# Patient Record
Sex: MALE | Race: WHITE | Employment: UNEMPLOYED | URBAN - METROPOLITAN AREA
[De-identification: names, ages, dates, MRNs, and addresses within clinical notes are randomized per-mention and may not be internally consistent; named-entity substitution may affect disease eponyms.]

---

## 2017-10-11 ENCOUNTER — NON-PROVIDER VISIT (OUTPATIENT)
Dept: OCCUPATIONAL MEDICINE | Facility: CLINIC | Age: 32
End: 2017-10-11

## 2017-10-11 DIAGNOSIS — Z02.1 PRE-EMPLOYMENT DRUG TESTING: ICD-10-CM

## 2017-10-11 DIAGNOSIS — Z02.1 PRE-EMPLOYMENT DRUG SCREENING: ICD-10-CM

## 2017-10-11 LAB
AMP AMPHETAMINE: NORMAL
COC COCAINE: NORMAL
INT CON NEG: NORMAL
INT CON POS: NORMAL
MET METHAMPHETAMINES: NORMAL
OPI OPIATES: NORMAL
PCP PHENCYCLIDINE: NORMAL
POC DRUG COMMENT 753798-OCCUPATIONAL HEALTH: NORMAL
THC: NORMAL

## 2017-10-11 PROCEDURE — 80305 DRUG TEST PRSMV DIR OPT OBS: CPT | Performed by: PREVENTIVE MEDICINE

## 2018-10-23 ENCOUNTER — OFFICE VISIT (OUTPATIENT)
Dept: URGENT CARE | Facility: CLINIC | Age: 33
End: 2018-10-23

## 2018-10-23 VITALS
WEIGHT: 186 LBS | BODY MASS INDEX: 27.55 KG/M2 | DIASTOLIC BLOOD PRESSURE: 66 MMHG | TEMPERATURE: 98.3 F | RESPIRATION RATE: 18 BRPM | HEART RATE: 74 BPM | HEIGHT: 69 IN | OXYGEN SATURATION: 96 % | SYSTOLIC BLOOD PRESSURE: 108 MMHG

## 2018-10-23 DIAGNOSIS — M54.50 LUMBAR PAIN: ICD-10-CM

## 2018-10-23 PROCEDURE — 99204 OFFICE O/P NEW MOD 45 MIN: CPT | Performed by: PHYSICIAN ASSISTANT

## 2018-10-23 RX ORDER — CYCLOBENZAPRINE HCL 5 MG
5-10 TABLET ORAL 3 TIMES DAILY PRN
Qty: 21 TAB | Refills: 0 | Status: SHIPPED | OUTPATIENT
Start: 2018-10-23

## 2018-10-23 RX ORDER — KETOROLAC TROMETHAMINE 30 MG/ML
60 INJECTION, SOLUTION INTRAMUSCULAR; INTRAVENOUS ONCE
Status: COMPLETED | OUTPATIENT
Start: 2018-10-23 | End: 2018-10-23

## 2018-10-23 RX ORDER — METHYLPREDNISOLONE 4 MG/1
TABLET ORAL
Qty: 1 KIT | Refills: 0 | Status: SHIPPED | OUTPATIENT
Start: 2018-10-23

## 2018-10-23 RX ADMIN — KETOROLAC TROMETHAMINE 60 MG: 30 INJECTION, SOLUTION INTRAMUSCULAR; INTRAVENOUS at 13:10

## 2018-10-23 NOTE — LETTER
October 23, 2018         Patient: Yazan Hough   YOB: 1985   Date of Visit: 10/23/2018           To Whom it May Concern:    Yazan Hough was seen in my clinic on 10/23/2018. Please excuse this patient from work due to recent back ailment- he will be out Wednesday- Friday.     If you have any questions or concerns, please don't hesitate to call.        Sincerely,           Ney Correa P.A.-C.  Electronically Signed

## 2018-10-26 ASSESSMENT — ENCOUNTER SYMPTOMS
NAUSEA: 0
WHEEZING: 0
VOMITING: 0
COUGH: 0
PERIANAL NUMBNESS: 0
CHILLS: 0
NUMBNESS: 0
EYE REDNESS: 0
EYE DISCHARGE: 0
FALLS: 0
TINGLING: 0
ABDOMINAL PAIN: 0
BACK PAIN: 1
DIARRHEA: 0
NECK PAIN: 0
SENSORY CHANGE: 0
HEADACHES: 0
SORE THROAT: 0
FEVER: 0
LEG PAIN: 0
BOWEL INCONTINENCE: 0

## 2018-10-26 NOTE — PROGRESS NOTES
"Subjective:      Yazan Hough is a 33 y.o. male who presents with Back Pain (x1day, lower back pain unable to move-)            Pt is 34 y/o male who presents with low back pain on awakening 2 days ago. He reports that he awoke with stiffness, and then felt that the right side of his low back became worse.   He denies any leg pain, incontinence, saddle anesthesias, fevers. He denies any fall, trauma, or noted injury. He denies prior hx of same in the past.       Back Pain   This is a new problem. The pain is at a severity of 7/10. The pain is mild. The symptoms are aggravated by bending and position. Pertinent negatives include no abdominal pain, bladder incontinence, bowel incontinence, dysuria, fever, headaches, leg pain, numbness, perianal numbness or tingling. He has tried heat (Topical rub. ) for the symptoms. The treatment provided mild relief.       Review of Systems   Constitutional: Positive for malaise/fatigue. Negative for chills and fever.   HENT: Negative for ear discharge, ear pain and sore throat.    Eyes: Negative for discharge and redness.   Respiratory: Negative for cough and wheezing.    Gastrointestinal: Negative for abdominal pain, bowel incontinence, diarrhea, nausea and vomiting.   Genitourinary: Negative for bladder incontinence, dysuria and urgency.        Denies any new urinary or bowel incontinence   Musculoskeletal: Positive for back pain. Negative for falls and neck pain.        Specifically without leg pain or weakness   Skin: Negative for itching and rash.   Neurological: Negative for tingling, sensory change, numbness and headaches.   All other systems reviewed and are negative.         Objective:     /66 (BP Location: Left arm, Patient Position: Sitting, BP Cuff Size: Adult)   Pulse 74   Temp 36.8 °C (98.3 °F) (Temporal)   Resp 18   Ht 1.753 m (5' 9\")   Wt 84.4 kg (186 lb)   SpO2 96%   BMI 27.47 kg/m²    PMH:  has no past medical history on file.  MEDS:   Current " Outpatient Prescriptions:   •  MethylPREDNISolone (MEDROL DOSEPAK) 4 MG Tablet Therapy Pack, UAD, Disp: 1 Kit, Rfl: 0  •  cyclobenzaprine (FLEXERIL) 5 MG tablet, Take 1-2 Tabs by mouth 3 times a day as needed (Avoid while driving.)., Disp: 21 Tab, Rfl: 0  •  oxycodone-acetaminophen (PERCOCET) 5-325 MG TABS, Take 1-2 Tabs by mouth every four hours as needed for Mild Pain. pain, Disp: 20 Each, Rfl: 0  ALLERGIES:   Allergies   Allergen Reactions   • Codeine      SURGHX: History reviewed. No pertinent surgical history.  SOCHX:  reports that he has quit smoking. He has never used smokeless tobacco. He reports that he does not drink alcohol or use drugs.  FH: Family history was reviewed, no pertinent findings to report    Physical Exam   Constitutional: He is oriented to person, place, and time. He appears well-developed and well-nourished. No distress.   HENT:   Head: Normocephalic and atraumatic.   Eyes: Pupils are equal, round, and reactive to light. Conjunctivae and EOM are normal.   Neck: Normal range of motion. Neck supple. No tracheal deviation present.   Cardiovascular: Normal rate and regular rhythm.    No murmur heard.  Pulmonary/Chest: Effort normal. No respiratory distress.   Musculoskeletal:        Back:    Spine- without midline tenderness, step-off or deformity. Without scoliosis or kyphosis. Noted tenderness to the parapspinous - lumbar aspect. Limited ROM with lateral bend, and flexion/extension. Without noted tenderness over the sacroiliac notches. Sensation intact bilaterally, BLE motor 5/5 and symmetrical  strength. Negative Straight leg raise.  Gait- WNL without foot drop.      Neurological: He is alert and oriented to person, place, and time. Coordination normal.   Skin: Skin is warm. No rash noted.   Psychiatric: He has a normal mood and affect. His behavior is normal. Judgment and thought content normal.   Vitals reviewed.              Assessment/Plan:     1. Lumbar pain.     - ketorolac (TORADOL)  injection 60 mg; 2 mL by Intramuscular route Once.  - MethylPREDNISolone (MEDROL DOSEPAK) 4 MG Tablet Therapy Pack; UAD  Dispense: 1 Kit; Refill: 0  - cyclobenzaprine (FLEXERIL) 5 MG tablet; Take 1-2 Tabs by mouth 3 times a day as needed (Avoid while driving.).  Dispense: 21 Tab; Refill: 0    Pt. Without red flag symptoms at this time.   Encouraged light stretching along with OTC meds- utilize tylenol over NSAIDs at this time- no NSAIDs with steroids.   Encouraged F/U with PCP at this time- if not improving patient may require imaging- however he is without midline tenderness at this time.   Patient given precautionary s/sx that mandate immediate follow up and evaluation in the ED. Advised of risks of not doing so.    DDX, Supportive care, and indications for immediate follow-up discussed with patient.    Instructed to return to clinic or nearest emergency department if we are not available for any change in condition, further concerns, or worsening of symptoms.    The patient demonstrated a good understanding and agreed with the treatment plan.  Please note that this dictation was created using voice recognition software. I have made every reasonable attempt to correct obvious errors, but I expect that there are errors of grammar and possibly content that I did not discover before finalizing the note.

## 2019-05-09 ENCOUNTER — TELEPHONE (OUTPATIENT)
Dept: HEMATOLOGY ONCOLOGY | Facility: MEDICAL CENTER | Age: 34
End: 2019-05-09

## 2019-05-09 NOTE — TELEPHONE ENCOUNTER
1st attempt to call patient.    Called patient for no show appointment with  today 05/09/2019 at 11:20. The patient does not have voice mail.

## 2023-03-30 ENCOUNTER — APPOINTMENT (OUTPATIENT)
Dept: RADIOLOGY | Facility: MEDICAL CENTER | Age: 38
End: 2023-03-30
Attending: EMERGENCY MEDICINE
Payer: MEDICARE

## 2023-03-30 ENCOUNTER — HOSPITAL ENCOUNTER (EMERGENCY)
Facility: MEDICAL CENTER | Age: 38
End: 2023-03-30
Attending: EMERGENCY MEDICINE
Payer: MEDICARE

## 2023-03-30 ENCOUNTER — APPOINTMENT (OUTPATIENT)
Dept: RADIOLOGY | Facility: MEDICAL CENTER | Age: 38
End: 2023-03-30
Payer: MEDICARE

## 2023-03-30 VITALS
RESPIRATION RATE: 18 BRPM | HEIGHT: 69 IN | BODY MASS INDEX: 28.88 KG/M2 | SYSTOLIC BLOOD PRESSURE: 129 MMHG | OXYGEN SATURATION: 96 % | TEMPERATURE: 97.8 F | WEIGHT: 195 LBS | DIASTOLIC BLOOD PRESSURE: 93 MMHG | HEART RATE: 92 BPM

## 2023-03-30 DIAGNOSIS — F10.920 ALCOHOLIC INTOXICATION WITHOUT COMPLICATION (HCC): ICD-10-CM
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
## 2023-03-30 DIAGNOSIS — S00.531A CONTUSION OF LIP, INITIAL ENCOUNTER: ICD-10-CM

## 2023-03-30 DIAGNOSIS — R04.0 EPISTAXIS: ICD-10-CM

## 2023-03-30 PROCEDURE — 71045 X-RAY EXAM CHEST 1 VIEW: CPT

## 2023-03-30 PROCEDURE — 99284 EMERGENCY DEPT VISIT MOD MDM: CPT

## 2023-03-30 PROCEDURE — 72125 CT NECK SPINE W/O DYE: CPT

## 2023-03-30 PROCEDURE — 305948 HCHG GREEN TRAUMA ACT PRE-NOTIFY NO CC

## 2023-03-30 PROCEDURE — 70450 CT HEAD/BRAIN W/O DYE: CPT
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
## 2023-03-30 NOTE — ED NOTES
Pt brought back to room from CT. Pt jumping out of bed to urinate.  Pt assisted with standing and using urinal.  UA collected and sent to lab. Pt educated to stay in bed and to use call light. Pt verbalized understanding.

## 2023-03-30 NOTE — PROGRESS NOTES
I was paged at 1052 to consult on this trauma green activation. I arrived at the patient's patient bedside at [phone].

## 2023-03-30 NOTE — ED NOTES
Pt has discharge orders. Pt educated on discharge instructions.  Pt verbalizes understanding.  PIV removed. Pt ambulatory to ambulance bay with Anderson MUNOZ.

## 2023-03-30 NOTE — ED NOTES
Pt has discharge orders. Pt educated on discharge instructions.  Pt verbalizes understanding.  PIV removed. Pt ambulatory to ambulance bay with steady gait. Pt going home with Anderson MUNOZ.    (4) no impairment
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
## 2023-03-30 NOTE — DISCHARGE INSTRUCTIONS
Ice to painful areas 20 minutes on, 20 minutes off as often as possible.  Ibuprofen and Tylenol for pain.  Please do not drink and drive.    Cleared for booking in intermediate

## 2023-04-10 ENCOUNTER — HOSPITAL ENCOUNTER (OUTPATIENT)
Dept: BEHAVIORAL HEALTH | Facility: MEDICAL CENTER | Age: 38
End: 2023-04-10
Attending: PSYCHIATRY & NEUROLOGY
Payer: MEDICARE

## 2023-04-10 DIAGNOSIS — F10.10 ALCOHOL ABUSE, EPISODIC DRINKING BEHAVIOR: ICD-10-CM

## 2023-04-10 PROCEDURE — 90791 PSYCH DIAGNOSTIC EVALUATION: CPT
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
## 2023-04-17 ENCOUNTER — HOSPITAL ENCOUNTER (OUTPATIENT)
Dept: BEHAVIORAL HEALTH | Facility: MEDICAL CENTER | Age: 38
End: 2023-04-17
Attending: PSYCHIATRY & NEUROLOGY
Payer: MEDICARE

## 2023-04-17 DIAGNOSIS — F10.10 ALCOHOL ABUSE, EPISODIC DRINKING BEHAVIOR: ICD-10-CM

## 2023-04-17 PROCEDURE — 90853 GROUP PSYCHOTHERAPY: CPT | Performed by: SOCIAL WORKER

## 2023-04-17 NOTE — GROUP NOTE
Group Appointment Information    Date: 04/17/23   Attendance Duration: 60 minutes  Number of Participants: 5 participants  Program / Group: IOP - Intensive Outpatient Program  Topics Covered: Boundaries      Group Therapy Start Time: 11:00 AM    Attendance: Attended  Participation: Active verbal participation    Affect/Mood Range: Flexible  Affect/Mood Display: CWC - Congruent w/Content  Cognition: Alert    Evidence of imminent suicide risk: No   Evidence of imminent homicide risk: No     Therapeutic Interventions: Goal-setting  Progress Toward Treatment Goal: Mild improvement

## 2023-04-17 NOTE — ADDENDUM NOTE
Encounter addended by: Carmelita Harrell, Ph.D. on: 4/17/2023 12:04 PM   Actions taken: Clinical Note Signed, Charge Capture section accepted

## 2023-04-17 NOTE — GROUP NOTE
Group Appointment Information    Date: 04/17/23   Attendance Duration: 60 minutes  Number of Participants: 6 participants  Program / Group: IOP - Intensive Outpatient Program  Topics Covered: Cognitive distortions      Group Therapy Start Time: 10:00 AM    Attendance: Attended  Participation: Active verbal participation    Affect/Mood Range: Flexible  Affect/Mood Display: CWC - Congruent w/Content  Cognition: Alert    Evidence of imminent suicide risk: No   Evidence of imminent homicide risk: No     Therapeutic Interventions: Cognitive clarification  Progress Toward Treatment Goal: Mild improvement

## 2023-04-17 NOTE — ADDENDUM NOTE
Encounter addended by: Carmelita Harrell, Ph.D. on: 4/17/2023 12:15 PM   Actions taken: Clinical Note Signed, Charge Capture section accepted

## 2023-04-17 NOTE — GROUP NOTE
Group Appointment Information    Date: 04/17/23   Attendance Duration: 60 minutes  Number of Participants: 7 participants  Program / Group: IOP - Intensive Outpatient Program  Topics Covered: Regulating emotions      Group Therapy Start Time:  9:00 AM    Attendance: Attended  Participation: Active verbal participation    Affect/Mood Range: Flexible  Affect/Mood Display: CWC - Congruent w/Content  Cognition: Alert    Evidence of imminent suicide risk: No   Evidence of imminent homicide risk: No     Therapeutic Interventions: Distress tolerance skills  Progress Toward Treatment Goal: Mild improvement

## 2023-04-19 ENCOUNTER — HOSPITAL ENCOUNTER (OUTPATIENT)
Dept: BEHAVIORAL HEALTH | Facility: MEDICAL CENTER | Age: 38
End: 2023-04-19
Attending: PSYCHIATRY & NEUROLOGY
Payer: MEDICARE

## 2023-04-19 PROCEDURE — 90853 GROUP PSYCHOTHERAPY: CPT | Performed by: SOCIAL WORKER

## 2023-04-19 NOTE — GROUP NOTE
Group Appointment Information    Date: 04/19/23   Attendance Duration: 60 minutes  Number of Participants: 5 participants  Program / Group: IOP - Intensive Outpatient Program  Topics Covered: 12 Step orientation      Group Therapy Start Time: 10:00 AM    Attendance: Attended  Participation: Active verbal participation    Affect/Mood Range: Flexible  Affect/Mood Display: CWC - Congruent w/Content  Cognition: Alert    Evidence of imminent suicide risk: No   Evidence of imminent homicide risk: No     Therapeutic Interventions: Relapse prevention  Progress Toward Treatment Goal: Mild improvement

## 2023-04-19 NOTE — GROUP NOTE
Group Appointment Information    Date: 04/19/23   Attendance Duration: 60 minutes  Number of Participants: 5 participants  Program / Group: IOP - Intensive Outpatient Program  Topics Covered: Regulating emotions      Group Therapy Start Time:  9:00 AM    Attendance: Attended  Participation: Active verbal participation    Affect/Mood Range: Flexible  Affect/Mood Display: CWC - Congruent w/Content  Cognition: Alert    Evidence of imminent suicide risk: No   Evidence of imminent homicide risk: No     Therapeutic Interventions: Emotional regulation skill building  Progress Toward Treatment Goal: Mild improvement

## 2023-04-19 NOTE — GROUP NOTE
Group Appointment Information    Date: 04/19/23   Attendance Duration: 60 minutes  Number of Participants: 5 participants  Program / Group: IOP - Intensive Outpatient Program  Topics Covered: Care in Relationships      Group Therapy Start Time: 11:00 AM    Attendance: Attended  Participation: Active verbal participation    Affect/Mood Range: Flexible  Affect/Mood Display: CWC - Congruent w/Content  Cognition: Alert    Evidence of imminent suicide risk: No   Evidence of imminent homicide risk: No     Therapeutic Interventions: Self-care skills  Progress Toward Treatment Goal: Mild improvement

## 2023-04-21 ENCOUNTER — OFFICE VISIT (OUTPATIENT)
Dept: BEHAVIORAL HEALTH | Facility: CLINIC | Age: 38
End: 2023-04-21
Payer: MEDICARE

## 2023-04-21 ENCOUNTER — HOSPITAL ENCOUNTER (OUTPATIENT)
Dept: BEHAVIORAL HEALTH | Facility: MEDICAL CENTER | Age: 38
End: 2023-04-21
Attending: PSYCHIATRY & NEUROLOGY
Payer: MEDICARE

## 2023-04-21 DIAGNOSIS — F33.1 MAJOR DEPRESSIVE DISORDER, RECURRENT EPISODE, MODERATE (HCC): ICD-10-CM

## 2023-04-21 PROCEDURE — 90853 GROUP PSYCHOTHERAPY: CPT | Performed by: SOCIAL WORKER

## 2023-04-21 PROCEDURE — 90792 PSYCH DIAG EVAL W/MED SRVCS: CPT | Performed by: PSYCHIATRY & NEUROLOGY

## 2023-04-21 RX ORDER — FLUOXETINE HYDROCHLORIDE 20 MG/1
20 CAPSULE ORAL 3 TIMES DAILY
Qty: 90 CAPSULE | Refills: 0 | Status: SHIPPED | OUTPATIENT
Start: 2023-04-21 | End: 2023-05-05

## 2023-04-21 ASSESSMENT — ANXIETY QUESTIONNAIRES
5. BEING SO RESTLESS THAT IT IS HARD TO SIT STILL: NOT AT ALL
3. WORRYING TOO MUCH ABOUT DIFFERENT THINGS: SEVERAL DAYS
7. FEELING AFRAID AS IF SOMETHING AWFUL MIGHT HAPPEN: NOT AT ALL
2. NOT BEING ABLE TO STOP OR CONTROL WORRYING: NOT AT ALL
4. TROUBLE RELAXING: MORE THAN HALF THE DAYS
IF YOU CHECKED OFF ANY PROBLEMS ON THIS QUESTIONNAIRE, HOW DIFFICULT HAVE THESE PROBLEMS MADE IT FOR YOU TO DO YOUR WORK, TAKE CARE OF THINGS AT HOME, OR GET ALONG WITH OTHER PEOPLE: SOMEWHAT DIFFICULT
GAD7 TOTAL SCORE: 4
1. FEELING NERVOUS, ANXIOUS, OR ON EDGE: NOT AT ALL
6. BECOMING EASILY ANNOYED OR IRRITABLE: SEVERAL DAYS

## 2023-04-21 ASSESSMENT — PATIENT HEALTH QUESTIONNAIRE - PHQ9
5. POOR APPETITE OR OVEREATING: 3 - NEARLY EVERY DAY
CLINICAL INTERPRETATION OF PHQ2 SCORE: 0

## 2023-04-21 NOTE — GROUP NOTE
Group Appointment Information    Date: 04/21/23   Attendance Duration: 60 minutes  Number of Participants: 7 participants  Program / Group: IOP - Intensive Outpatient Program  Topics Covered: Values based action      Group Therapy Start Time: 10:00 AM    Attendance: Attended  Participation: Active verbal participation    Affect/Mood Range: Flexible  Affect/Mood Display: CWC - Congruent w/Content  Cognition: Alert    Evidence of imminent suicide risk: No   Evidence of imminent homicide risk: No     Therapeutic Interventions: Goal-setting  Progress Toward Treatment Goal: Mild improvement
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
## 2023-04-21 NOTE — GROUP NOTE
Group Appointment Information    Date: 04/21/23   Attendance Duration: 60 minutes  Number of Participants: 7 participants  Program / Group: IOP - Intensive Outpatient Program  Topics Covered: Boundaries      Group Therapy Start Time: 11:00 AM    Attendance: Attended  Participation: Active verbal participation    Affect/Mood Range: Flexible  Affect/Mood Display: CWC - Congruent w/Content  Cognition: Alert    Evidence of imminent suicide risk: No   Evidence of imminent homicide risk: No     Therapeutic Interventions: Goal-setting-Challenged patient to develop a future focus and create goals to work towards. Also encouraged patient to seek others who are experiencing similar medical challenges and create a support system and learn from others who have demonstrated resilience.  Progress Toward Treatment Goal: Mild improvement

## 2023-04-21 NOTE — GROUP NOTE
Group Appointment Information    Date: 04/21/23   Attendance Duration: 60 minutes  Number of Participants: 7 participants  Program / Group: IOP - Intensive Outpatient Program  Topics Covered: Self Affirmation      Group Therapy Start Time:  9:00 AM    Attendance: Attended  Participation: Active verbal participation    Affect/Mood Range: Flexible  Affect/Mood Display: CWC - Congruent w/Content  Cognition: Alert    Evidence of imminent suicide risk: No   Evidence of imminent homicide risk: No     Therapeutic Interventions: Self-care skills  Progress Toward Treatment Goal: Mild improvement

## 2023-04-24 ENCOUNTER — HOSPITAL ENCOUNTER (OUTPATIENT)
Dept: BEHAVIORAL HEALTH | Facility: MEDICAL CENTER | Age: 38
End: 2023-04-24
Attending: PSYCHIATRY & NEUROLOGY
Payer: MEDICARE

## 2023-04-24 PROCEDURE — 90853 GROUP PSYCHOTHERAPY: CPT | Performed by: SOCIAL WORKER

## 2023-04-25 NOTE — GROUP NOTE
Group Appointment Information    Date: 04/24/23   Attendance Duration: 60 minutes  Number of Participants: 6 participants  Program / Group: IOP - Intensive Outpatient Program  Topics Covered: Relationships in Recovery      Group Therapy Start Time: 11:00 AM    Attendance: Attended  Participation: Active verbal participation    Affect/Mood Range: Flexible  Affect/Mood Display: CWC - Congruent w/Content  Cognition: Alert    Evidence of imminent suicide risk: No   Evidence of imminent homicide risk: No     Therapeutic Interventions: Reality-Testing  Progress Toward Treatment Goal: Mild improvement

## 2023-04-25 NOTE — GROUP NOTE
Group Appointment Information    Date: 04/24/23   Attendance Duration: 60 minutes  Number of Participants: 7 participants  Program / Group: IOP - Intensive Outpatient Program  Topics Covered: Care in Relationships      Group Therapy Start Time:  9:00 AM    Attendance: Attended  Participation: Active verbal participation    Affect/Mood Range: Flexible  Affect/Mood Display: CWC - Congruent w/Content  Cognition: Alert    Evidence of imminent suicide risk: No   Evidence of imminent homicide risk: No     Therapeutic Interventions: Motivation addressed  Progress Toward Treatment Goal: Mild improvement

## 2023-04-25 NOTE — GROUP NOTE
Group Appointment Information    Date: 04/24/23   Attendance Duration: 60 minutes  Number of Participants: 6 participants  Program / Group: IOP - Intensive Outpatient Program  Topics Covered: Anxiety Mgmt      Group Therapy Start Time: 10:00 AM    Attendance: Attended  Participation: Active verbal participation    Affect/Mood Range: Flexible  Affect/Mood Display: CWC - Congruent w/Content  Cognition: Alert    Evidence of imminent suicide risk: No   Evidence of imminent homicide risk: No     Therapeutic Interventions: Limit-setting  Progress Toward Treatment Goal: Mild improvement

## 2023-04-26 ENCOUNTER — HOSPITAL ENCOUNTER (OUTPATIENT)
Dept: BEHAVIORAL HEALTH | Facility: MEDICAL CENTER | Age: 38
End: 2023-04-26
Attending: PSYCHIATRY & NEUROLOGY
Payer: MEDICARE

## 2023-04-26 PROCEDURE — 90853 GROUP PSYCHOTHERAPY: CPT | Performed by: SOCIAL WORKER

## 2023-04-26 NOTE — GROUP NOTE
Group Appointment Information    Date: 04/26/23   Attendance Duration: 60 minutes  Number of Participants: 7 participants  Program / Group: IOP - Intensive Outpatient Program  Topics Covered: Dual Diagnosis, Depression Recovery, and Regulating emotions      Group Therapy Start Time: 10:00 AM    Attendance: Attended  Participation: Active verbal participation    Affect/Mood Range: Flexible  Affect/Mood Display: CWC - Congruent w/Content  Cognition: Alert    Evidence of imminent suicide risk: No   Evidence of imminent homicide risk: No     Therapeutic Interventions: Relapse prevention  Progress Toward Treatment Goal: Mild improvement

## 2023-04-26 NOTE — GROUP NOTE
Group Appointment Information    Date: 04/26/23   Attendance Duration: 60 minutes  Number of Participants: 7 participants  Program / Group: IOP - Intensive Outpatient Program  Topics Covered: Belief Systems      Group Therapy Start Time: 11:00 AM    Attendance: Attended  Participation: Active verbal participation    Affect/Mood Range: Flexible  Affect/Mood Display: CWC - Congruent w/Content  Cognition: Alert    Evidence of imminent suicide risk: No   Evidence of imminent homicide risk: No     Therapeutic Interventions: Problem-solving  Progress Toward Treatment Goal: Mild improvement

## 2023-04-26 NOTE — GROUP NOTE
Group Appointment Information    Date: 04/26/23   Attendance Duration: 60 minutes  Number of Participants: 7 participants  Program / Group: IOP - Intensive Outpatient Program  Topics Covered: Self Affirmation      Group Therapy Start Time:  9:00 AM    Attendance: Attended  Participation: Active verbal participation    Affect/Mood Range: Flexible  Affect/Mood Display: CWC - Congruent w/Content  Cognition: Alert    Evidence of imminent suicide risk: No   Evidence of imminent homicide risk: No     Therapeutic Interventions: Self-care skills  Progress Toward Treatment Goal: Mild improvement

## 2023-04-28 ENCOUNTER — HOSPITAL ENCOUNTER (OUTPATIENT)
Dept: BEHAVIORAL HEALTH | Facility: MEDICAL CENTER | Age: 38
End: 2023-04-28
Attending: PSYCHIATRY & NEUROLOGY
Payer: MEDICARE

## 2023-04-28 ENCOUNTER — HOSPITAL ENCOUNTER (OUTPATIENT)
Facility: MEDICAL CENTER | Age: 38
End: 2023-04-28
Attending: STUDENT IN AN ORGANIZED HEALTH CARE EDUCATION/TRAINING PROGRAM
Payer: MEDICARE

## 2023-04-28 PROCEDURE — 87086 URINE CULTURE/COLONY COUNT: CPT

## 2023-04-28 PROCEDURE — 90853 GROUP PSYCHOTHERAPY: CPT | Performed by: SOCIAL WORKER

## 2023-04-28 NOTE — GROUP NOTE
Group Appointment Information    Date: 04/28/23   Attendance Duration: 90 minutes  Number of Participants: 7 participants  Program / Group: IOP - Intensive Outpatient Program  Topics Covered: Care in Relationships      Group Therapy Start Time:  9:00 AM    Attendance: Attended  Participation: Active verbal participation    Affect/Mood Range: Normal range  Affect/Mood Display: CWC - Congruent w/Content  Cognition: Alert    Evidence of imminent suicide risk: No   Evidence of imminent homicide risk: No     Therapeutic Interventions: Supportive psychotherapy and Interpersonal effectiveness skills  Progress Toward Treatment Goal: Mild improvement

## 2023-04-28 NOTE — GROUP NOTE
Group Appointment Information    Date: 04/28/23   Attendance Duration: 60 minutes  Number of Participants: 7 participants  Program / Group: IOP - Intensive Outpatient Program  Topics Covered: Values based action  Second group continuation of morning topic, exploring more in depth how family impacts development of values      Group Therapy Start Time: 10:30 AM    Attendance: Attended  Participation: Active verbal participation and Attentive    Affect/Mood Range: Normal range  Affect/Mood Display: CWC - Congruent w/Content  Cognition: Alert    Evidence of imminent suicide risk: No   Evidence of imminent homicide risk: No     Therapeutic Interventions: Socialization and Supportive psychotherapy  Progress Toward Treatment Goal: Mild improvement

## 2023-04-28 NOTE — GROUP NOTE
Group Appointment Information    Date: 04/28/23   Attendance Duration: 30 minutes  Number of Participants: 7 participants  Program / Group: IOP - Intensive Outpatient Program  Topics Covered: Values based action    Wrap up of today's topics and review of trauma.      Group Therapy Start Time: 11:30 AM    Attendance: Attended  Participation: Active verbal participation and Attentive    Affect/Mood Range: Normal range  Affect/Mood Display: CWC - Congruent w/Content  Cognition: Alert    Evidence of imminent suicide risk: No  Evidence of imminent homicide risk: No     Therapeutic Interventions: Values clarification, Supportive psychotherapy, and Interpersonal effectiveness skills  Progress Toward Treatment Goal: Mild improvement

## 2023-05-01 ENCOUNTER — HOSPITAL ENCOUNTER (OUTPATIENT)
Dept: BEHAVIORAL HEALTH | Facility: MEDICAL CENTER | Age: 38
End: 2023-05-01
Attending: PSYCHIATRY & NEUROLOGY
Payer: MEDICARE

## 2023-05-01 LAB
BACTERIA UR CULT: NORMAL
SIGNIFICANT IND 70042: NORMAL
SITE SITE: NORMAL
SOURCE SOURCE: NORMAL

## 2023-05-01 PROCEDURE — 90853 GROUP PSYCHOTHERAPY: CPT | Performed by: SOCIAL WORKER

## 2023-05-01 NOTE — GROUP NOTE
Group Appointment Information    Date: 05/01/23   Attendance Duration: 60 minutes  Number of Participants: 9 participants  Program / Group: IOP - Intensive Outpatient Program  Topics Covered: Values based action      Group Therapy Start Time: 11:00 AM    Attendance: Attended  Participation: Active verbal participation    Affect/Mood Range: Flexible  Affect/Mood Display: CWC - Congruent w/Content  Cognition: Alert    Evidence of imminent suicide risk: No   Evidence of imminent homicide risk: No     Therapeutic Interventions: Values clarification-homework-start looking at community [email or web address] for possible job training programs of interest-report back next session-goal is to move forward beyond the belief that I cannot do anything because of my challenges in ability to walk  Progress Toward Treatment Goal: Mild improvement

## 2023-05-01 NOTE — GROUP NOTE
Group Appointment Information    Date: 05/01/23   Attendance Duration: 60 minutes  Number of Participants: 9 participants  Program / Group: IOP - Intensive Outpatient Program  Topics Covered: Mindfulness      Group Therapy Start Time:  9:00 AM    Attendance: Attended  Participation: Active verbal participation    Affect/Mood Range: Flexible  Affect/Mood Display: CWC - Congruent w/Content  Cognition: Alert    Evidence of imminent suicide risk: No   Evidence of imminent homicide risk: No     Therapeutic Interventions: Mindfulness exercise  Progress Toward Treatment Goal: Mild improvement

## 2023-05-01 NOTE — GROUP NOTE
Group Appointment Information    Date: 05/01/23   Attendance Duration: 60 minutes  Number of Participants: 9 participants  Program / Group: IOP - Intensive Outpatient Program  Topics Covered: Healing Family within      Group Therapy Start Time: 10:00 AM    Attendance: Attended  Participation: Active verbal participation    Affect/Mood Range: Flexible  Affect/Mood Display: CWC - Congruent w/Content  Cognition: Alert    Evidence of imminent suicide risk: No   Evidence of imminent homicide risk: No     Therapeutic Interventions: Psychoeducation  Progress Toward Treatment Goal: Mild improvement

## 2023-05-03 ENCOUNTER — HOSPITAL ENCOUNTER (OUTPATIENT)
Dept: BEHAVIORAL HEALTH | Facility: MEDICAL CENTER | Age: 38
End: 2023-05-03
Attending: PSYCHIATRY & NEUROLOGY
Payer: MEDICARE

## 2023-05-03 PROCEDURE — 90853 GROUP PSYCHOTHERAPY: CPT | Performed by: SOCIAL WORKER

## 2023-05-03 NOTE — GROUP NOTE
"Group Appointment Information    Date: 05/03/23   Attendance Duration: 60 minutes  Number of Participants: 7 participants  Program / Group: IOP - Intensive Outpatient Program  Topics Covered: Commitment to change      Group Therapy Start Time:  9:00 AM    Attendance: Attended  Participation: Active verbal participation    Affect/Mood Range: Flexible  Affect/Mood Display: CWC - Congruent w/Content  Cognition: Alert    Evidence of imminent suicide risk: No   Evidence of imminent homicide risk: No     Therapeutic Interventions: Motivation addressed  Progress Toward Treatment Goal: Mild improvement Patient struggling with identifying a goal and developing action steps, continues to say \"I am stuck\".  "

## 2023-05-03 NOTE — GROUP NOTE
Group Appointment Information    Date: 05/03/23   Attendance Duration: 60 minutes  Number of Participants: 7 participants  Program / Group: IOP - Intensive Outpatient Program  Topics Covered: Goal setting      Group Therapy Start Time: 10:00 AM    Attendance: Attended  Participation: Active verbal participation    Affect/Mood Range: Flexible  Affect/Mood Display: CWC - Congruent w/Content  Cognition: Alert    Evidence of imminent suicide risk: No   Evidence of imminent homicide risk: No     Therapeutic Interventions: Goal-setting  Progress Toward Treatment Goal: Mild improvement

## 2023-05-03 NOTE — GROUP NOTE
Group Appointment Information    Date: 05/03/23   Attendance Duration: 60 minutes  Number of Participants: 7 participants  Program / Group: IOP - Intensive Outpatient Program  Topics Covered: Stress Management      Group Therapy Start Time: 11:00 AM    Attendance: Attended  Participation: Active verbal participation    Affect/Mood Range: Flexible  Affect/Mood Display: CWC - Congruent w/Content  Cognition: Alert    Evidence of imminent suicide risk: No   Evidence of imminent homicide risk: No     Therapeutic Interventions: Problem-solving  Progress Toward Treatment Goal: Mild improvement

## 2023-05-05 ENCOUNTER — OFFICE VISIT (OUTPATIENT)
Dept: BEHAVIORAL HEALTH | Facility: CLINIC | Age: 38
End: 2023-05-05
Payer: MEDICARE

## 2023-05-05 ENCOUNTER — HOSPITAL ENCOUNTER (OUTPATIENT)
Dept: BEHAVIORAL HEALTH | Facility: MEDICAL CENTER | Age: 38
End: 2023-05-05
Attending: PSYCHIATRY & NEUROLOGY
Payer: MEDICARE

## 2023-05-05 DIAGNOSIS — F41.1 GENERALIZED ANXIETY DISORDER: ICD-10-CM

## 2023-05-05 DIAGNOSIS — F33.1 MAJOR DEPRESSIVE DISORDER, RECURRENT EPISODE, MODERATE (HCC): ICD-10-CM

## 2023-05-05 PROCEDURE — 90853 GROUP PSYCHOTHERAPY: CPT | Performed by: SOCIAL WORKER

## 2023-05-05 PROCEDURE — 99214 OFFICE O/P EST MOD 30 MIN: CPT | Performed by: PSYCHIATRY & NEUROLOGY

## 2023-05-05 RX ORDER — ESCITALOPRAM OXALATE 10 MG/1
10 TABLET ORAL NIGHTLY
Qty: 30 TABLET | Refills: 0 | Status: SHIPPED | OUTPATIENT
Start: 2023-05-05 | End: 2023-05-30

## 2023-05-05 NOTE — GROUP NOTE
Group Appointment Information    Date: 05/05/23   Attendance Duration: 60 minutes  Number of Participants: 7 participants  Program / Group: IOP - Intensive Outpatient Program  Topics Covered: Relationships in Recovery      Group Therapy Start Time: 11:00 AM    Attendance: Attended  Participation: Active verbal participation    Affect/Mood Range: Normal range  Affect/Mood Display: CWC - Congruent w/Content  Cognition: Alert    Evidence of imminent suicide risk: No   Evidence of imminent homicide risk: No     Therapeutic Interventions: Treatment compliance addressed  Progress Toward Treatment Goal: Mild improvement

## 2023-05-05 NOTE — GROUP NOTE
"Group Appointment Information    Date: 05/05/23   Attendance Duration: 60 minutes  Number of Participants: 8 participants  Program / Group: IOP - Intensive Outpatient Program  Topics Covered: One Day at at Time      Group Therapy Start Time:  9:00 AM    Attendance: Attended  Participation: Active verbal participation    Affect/Mood Range: Flexible  Affect/Mood Display: CWC - Congruent w/Content  Cognition: Alert    Evidence of imminent suicide risk: No   Evidence of imminent homicide risk: No     Therapeutic Interventions: Goal-setting  Progress Toward Treatment Goal: Mild improvement Patient demonstrates low motivation to change or improve life. Patient is stuck in thinking there is nothing worth while he can do since his medical diagnosis 8 years ago. Patient sees himself as \"handicap\" (his word) and gives reasons for not making even small steps. Patient was asked to look on [email or web address] web page to see what types of job training programs they offered. Patient states he cannot do that at this time.  "

## 2023-05-05 NOTE — GROUP NOTE
Group Appointment Information    Date: 05/05/23   Attendance Duration: 60 minutes  Number of Participants: 7 participants  Program / Group: IOP - Intensive Outpatient Program  Topics Covered: Other (Comment):Assertive communication skills      Group Therapy Start Time: 10:00 AM    Attendance: Attended  Participation: Active verbal participation    Affect/Mood Range: Flexible  Affect/Mood Display: CWC - Congruent w/Content  Cognition: Alert    Evidence of imminent suicide risk: No   Evidence of imminent homicide risk: No     Therapeutic Interventions: Communication skills  Progress Toward Treatment Goal: Mild improvement

## 2023-05-05 NOTE — PROGRESS NOTES
[facility]   Follow Up Assessment     This provider informed the patient their medical records are totally confidential except for the use by other providers involved in their care, or if the patient signs a release, or to report instances of child or elder abuse, or if it is determined they are an immediate risk to harm themselves or others.    Name: Yazan Hough  MRN: [id number]  : 1985  Age: 38 y.o.  Date of assessment: 2023  PCP: Pcp Pt States None      Subjective:  Chart reviewed prior to seeing him and his mother in my office.  He was seen most recently on  but had not increase the Prozac to 60 mg a.m. from 40 mg a.m. yet.  His mother indicates that he and his sister have been doing well until the neurological problems developed.  Anxiety is a significant problem for him.  He has not used any alcohol in the past 30 days.  He has a significant learning disability.  His mother is responding well to Lexapro.  We discussed treatment options.    Objective:  He ambulates very slowly with a cane.  He is alert, oriented, and cooperative.  Anxious.  Speech is normal rate.  Memory is fair.  Insight and judgment are fair.  No indication of psychotic thinking.    Current Risk:       Suicidal: Not suicidal       Homicidal: Not homicidal       Self-Harm: No plan to harm self       Relapse: (Low/Moderate/High): Moderate       Crisis Safety Plan Reviewed: Discussed with patient    Diagnosis:   Major depressive disorder, recurrent  Generalized anxiety disorder    Treatment Plan:  The current treatment plan consists of monthly psychiatric sessions designed to evaluate his depression and anxiety.    Duration will be for a minimum of 12 months and will be reviewed at each visit.    Goals: Remission of depression and control of anxiety in order to prevent relapse due to the chronic nature of his behavioral health problems and mental illness.  Decrease Prozac to 20 mg a.m. x1 week and discontinue.   In 2 weeks start Lexapro 10 mg at bedtime    Ken Cole M.D.      This note was created using voice recognition software (Dragon). The accuracy of the dictation is limited by the abilities of the software. I have reviewed the note prior to signing, however some errors in grammar and context are still possible. If you have any questions related to this note please do not hesitate to contact our office.

## 2023-05-08 ENCOUNTER — HOSPITAL ENCOUNTER (OUTPATIENT)
Dept: BEHAVIORAL HEALTH | Facility: MEDICAL CENTER | Age: 38
End: 2023-05-08
Attending: PSYCHIATRY & NEUROLOGY
Payer: MEDICARE

## 2023-05-08 PROCEDURE — 90853 GROUP PSYCHOTHERAPY: CPT | Performed by: SOCIAL WORKER

## 2023-05-08 NOTE — GROUP NOTE
Group Appointment Information    Date: 05/08/23   Attendance Duration: 60 minutes  Number of Participants: 5 participants  Program / Group: IOP - Intensive Outpatient Program  Topics Covered: Caring for Ourselves      Group Therapy Start Time:  9:00 AM    Attendance: Attended  Participation: Active verbal participation    Affect/Mood Range: Flexible  Affect/Mood Display: CWC - Congruent w/Content  Cognition: Alert    Evidence of imminent suicide risk: No   Evidence of imminent homicide risk: No     Therapeutic Interventions: Limit-setting  Progress Toward Treatment Goal: Mild improvement

## 2023-05-08 NOTE — GROUP NOTE
Group Appointment Information    Date: 05/08/23   Attendance Duration: 60 minutes  Number of Participants: 5 participants  Program / Group: IOP - Intensive Outpatient Program  Topics Covered: Relationships in Recovery      Group Therapy Start Time: 10:00 AM    Attendance: Attended  Participation: Active verbal participation    Affect/Mood Range: Flexible  Affect/Mood Display: CWC - Congruent w/Content  Cognition: Alert    Evidence of imminent suicide risk: No   Evidence of imminent homicide risk: No     Therapeutic Interventions: Conflict resolution  Progress Toward Treatment Goal: Mild improvement

## 2023-05-10 ENCOUNTER — HOSPITAL ENCOUNTER (OUTPATIENT)
Dept: BEHAVIORAL HEALTH | Facility: MEDICAL CENTER | Age: 38
End: 2023-05-10
Attending: PSYCHIATRY & NEUROLOGY
Payer: MEDICARE

## 2023-05-10 PROCEDURE — 90853 GROUP PSYCHOTHERAPY: CPT | Performed by: SOCIAL WORKER

## 2023-05-10 NOTE — GROUP NOTE
Group Appointment Information    Date: 05/10/23   Attendance Duration: 60 minutes  Number of Participants: 5 participants  Program / Group: IOP - Intensive Outpatient Program  Topics Covered: Other (Comment):Automatic Negative Thoughts      Group Therapy Start Time:  9:00 AM    Attendance: Attended  Participation: Active verbal participation    Affect/Mood Range: Flexible  Affect/Mood Display: CWC - Congruent w/Content  Cognition: Alert    Evidence of imminent suicide risk: No   Evidence of imminent homicide risk: No     Therapeutic Interventions: Motivation addressed  Progress Toward Treatment Goal: No change Working on moving patient towards becoming more engaged in developing goals and increased level of independence through exploring the possibility of going back to school for a trade or skill, or getting a part time job. Patient is resistant towards taking any actions regarding this currently.

## 2023-05-10 NOTE — GROUP NOTE
Group Appointment Information    Date: 05/10/23   Attendance Duration: 60 minutes  Number of Participants: 5 participants  Program / Group: IOP - Intensive Outpatient Program  Topics Covered: Values based action      Group Therapy Start Time: 11:00 AM    Attendance: Attended  Participation: Active verbal participation    Affect/Mood Range: Flexible  Affect/Mood Display: CWC - Congruent w/Content  Cognition: Alert    Evidence of imminent suicide risk: No   Evidence of imminent homicide risk: No     Therapeutic Interventions: Supportive psychotherapy  Progress Toward Treatment Goal: No change at this time

## 2023-05-10 NOTE — GROUP NOTE
Group Appointment Information    Date: 05/10/23   Attendance Duration: 60 minutes  Number of Participants: 5 participants  Program / Group: IOP - Intensive Outpatient Program  Topics Covered: Belief Systems      Group Therapy Start Time: 10:00 AM    Attendance: Attended  Participation: Active verbal participation    Affect/Mood Range: Flexible  Affect/Mood Display: CWC - Congruent w/Content  Cognition: Alert    Evidence of imminent suicide risk: No   Evidence of imminent homicide risk: No     Therapeutic Interventions: Motivation addressed  Progress Toward Treatment Goal: No change

## 2023-05-11 RX ORDER — FLUOXETINE HYDROCHLORIDE 20 MG/1
20 CAPSULE ORAL DAILY
Qty: 7 CAPSULE | Refills: 0 | Status: SHIPPED | OUTPATIENT
Start: 2023-05-11 | End: 2023-05-18

## 2023-05-12 ENCOUNTER — HOSPITAL ENCOUNTER (OUTPATIENT)
Dept: BEHAVIORAL HEALTH | Facility: MEDICAL CENTER | Age: 38
End: 2023-05-12
Attending: PSYCHIATRY & NEUROLOGY
Payer: MEDICARE

## 2023-05-12 PROCEDURE — 90853 GROUP PSYCHOTHERAPY: CPT | Performed by: SOCIAL WORKER

## 2023-05-12 NOTE — GROUP NOTE
Group Appointment Information    Date: 05/12/23   Attendance Duration: 60 minutes  Number of Participants: 4 participants  Program / Group: IOP - Intensive Outpatient Program  Topics Covered: Codependency      Group Therapy Start Time:  9:00 AM    Attendance: Attended  Participation: Active verbal participation    Affect/Mood Range: Flexible  Affect/Mood Display: CWC - Congruent w/Content  Cognition: Alert    Evidence of imminent suicide risk: No   Evidence of imminent homicide risk: No     Therapeutic Interventions: Other (Comment)Co-Dependency  Progress Toward Treatment Goal: Mild improvement

## 2023-05-13 NOTE — GROUP NOTE
Group Appointment Information    Date: 05/12/23   Attendance Duration: 60 minutes  Number of Participants: 2 participants  Program / Group: IOP - Intensive Outpatient Program  Topics Covered: Relationships in Recovery      Group Therapy Start Time: 10:00 AM    Attendance: Attended  Participation: Active verbal participation    Affect/Mood Range: Flexible  Affect/Mood Display: CWC - Congruent w/Content  Cognition: Alert    Evidence of imminent suicide risk: No   Evidence of imminent homicide risk: No     Therapeutic Interventions: Interpersonal effectiveness skills  Progress Toward Treatment Goal: Mild improvement

## 2023-05-13 NOTE — GROUP NOTE
Group Appointment Information    Date: 05/12/23   Attendance Duration: 60 minutes  Number of Participants: 3 participants  Program / Group: IOP - Intensive Outpatient Program  Topics Covered: Depression Recovery      Group Therapy Start Time: 11:00 AM    Attendance: Attended  Participation: Active verbal participation    Affect/Mood Range: Flexible  Affect/Mood Display: CWC - Congruent w/Content  Cognition: Alert    Evidence of imminent suicide risk: No   Evidence of imminent homicide risk: No     Therapeutic Interventions: Supportive psychotherapy  Progress Toward Treatment Goal: Mild improvement

## 2023-05-15 ENCOUNTER — HOSPITAL ENCOUNTER (OUTPATIENT)
Dept: BEHAVIORAL HEALTH | Facility: MEDICAL CENTER | Age: 38
End: 2023-05-15
Attending: PSYCHIATRY & NEUROLOGY
Payer: MEDICARE

## 2023-05-15 PROCEDURE — 90853 GROUP PSYCHOTHERAPY: CPT | Performed by: SOCIAL WORKER

## 2023-05-15 NOTE — GROUP NOTE
Group Appointment Information    Date: 05/15/23   Attendance Duration: 60 minutes  Number of Participants: 3 participants  Program / Group: IOP - Intensive Outpatient Program  Topics Covered: ACT concept intro      Group Therapy Start Time: 11:00 AM    Attendance: Attended  Participation: Active verbal participation    Affect/Mood Range: Flexible  Affect/Mood Display: CWC - Congruent w/Content  Cognition: Alert    Evidence of imminent suicide risk: No   Evidence of imminent homicide risk: No     Therapeutic Interventions: Cognitive Modification  Progress Toward Treatment Goal: Mild improvement

## 2023-05-15 NOTE — GROUP NOTE
Group Appointment Information    Date: 05/15/23   Attendance Duration: 60 minutes  Number of Participants: 3 participants  Program / Group: IOP - Intensive Outpatient Program  Topics Covered: Mindful practice      Group Therapy Start Time: 10:00 AM    Attendance: Attended  Participation: Active verbal participation    Affect/Mood Range: Flexible  Affect/Mood Display: CWC - Congruent w/Content  Cognition: Alert    Evidence of imminent suicide risk: No   Evidence of imminent homicide risk: No     Therapeutic Interventions: Emotion clarification  Progress Toward Treatment Goal: Mild improvement

## 2023-05-15 NOTE — GROUP NOTE
Group Appointment Information    Date: 05/15/23   Attendance Duration: 60 minutes  Number of Participants: 3 participants  Program / Group: IOP - Intensive Outpatient Program  Topics Covered: Regulating emotions      Group Therapy Start Time:  9:00 AM    Attendance: Attended  Participation: Active verbal participation    Affect/Mood Range: Flexible  Affect/Mood Display: CWC - Congruent w/Content  Cognition: Alert    Evidence of imminent suicide risk: No   Evidence of imminent homicide risk: No     Therapeutic Interventions: Mindfulness exercise  Progress Toward Treatment Goal: Mild improvement Patient struggles with engaging in actual change processes. Patient appears to be challenge in retaining information presented in group often stating, he does not know what was just said, or does not understand what was said. Clinician attempts to restate the information to assist patient in understanding material Clinician provides varying activities including writing activities, reading activities, drawing and art activities as well as watching videos, to give patient an opportunity to see the same information presented in different modalities.

## 2023-05-17 ENCOUNTER — HOSPITAL ENCOUNTER (OUTPATIENT)
Dept: BEHAVIORAL HEALTH | Facility: MEDICAL CENTER | Age: 38
End: 2023-05-17
Attending: PSYCHIATRY & NEUROLOGY
Payer: MEDICARE

## 2023-05-17 PROCEDURE — 90853 GROUP PSYCHOTHERAPY: CPT | Performed by: SOCIAL WORKER

## 2023-05-17 NOTE — GROUP NOTE
"Group Appointment Information    Date: 05/17/23   Attendance Duration: 60 minutes  Number of Participants: 4 participants  Program / Group: IOP - Intensive Outpatient Program  Topics Covered: Goal setting      Group Therapy Start Time:  9:00 AM    Attendance: Attended  Participation: Active verbal participation    Affect/Mood Range: Flexible  Affect/Mood Display: CWC - Congruent w/Content  Cognition: Alert    Evidence of imminent suicide risk: No   Evidence of imminent homicide risk: No     Therapeutic Interventions: Goal-setting  Progress Toward Treatment Goal: Mild improvement-Patient appears to struggle with cognitive processing tasks. Patient is often unable to connect to the discussion of the group or participate in goal setting tasks stating, \"I don't know\", or \"what does that mean\". Patient requires lots of prompting, explaining, re-stating material in different ways, in order to reach some level of understanding. If is difficult to assess patients comprehension of the material being presented and his ability to transfer any knowledge into practical application to his life outside of group.  "

## 2023-05-17 NOTE — GROUP NOTE
Group Appointment Information    Date: 05/17/23   Attendance Duration: 60 minutes  Number of Participants: 4 participants  Program / Group: IOP - Intensive Outpatient Program  Topics Covered: Boundaries      Group Therapy Start Time: 11:00 AM    Attendance: Attended  Participation: Active verbal participation    Affect/Mood Range: Flexible  Affect/Mood Display: CWC - Congruent w/Content  Cognition: Alert    Evidence of imminent suicide risk: No   Evidence of imminent homicide risk: No     Therapeutic Interventions: Interpersonal effectiveness skills  Progress Toward Treatment Goal: Mild improvement

## 2023-05-17 NOTE — GROUP NOTE
Group Appointment Information    Date: 05/17/23   Attendance Duration: 60 minutes  Number of Participants: 4 participants  Program / Group: IOP - Intensive Outpatient Program  Topics Covered: Self Affirmation      Group Therapy Start Time: 10:00 AM    Attendance: Attended  Participation: Active verbal participation    Affect/Mood Range: Flexible  Affect/Mood Display: CWC - Congruent w/Content  Cognition: Alert    Evidence of imminent suicide risk: No   Evidence of imminent homicide risk: No     Therapeutic Interventions: Self-care skills  Progress Toward Treatment Goal: Mild improvement

## 2023-05-19 ENCOUNTER — HOSPITAL ENCOUNTER (OUTPATIENT)
Dept: BEHAVIORAL HEALTH | Facility: MEDICAL CENTER | Age: 38
End: 2023-05-19
Attending: PSYCHIATRY & NEUROLOGY
Payer: MEDICARE

## 2023-05-19 PROCEDURE — 90853 GROUP PSYCHOTHERAPY: CPT | Performed by: SOCIAL WORKER

## 2023-05-19 NOTE — GROUP NOTE
Group Appointment Information    Date: 05/19/23   Attendance Duration: 60 minutes  Number of Participants: 4 participants  Program / Group: IOP - Intensive Outpatient Program  Topics Covered: Relationships in Recovery      Group Therapy Start Time: 11:00 AM    Attendance: Attended  Participation: Active verbal participation    Affect/Mood Range: Flexible  Affect/Mood Display: CWC - Congruent w/Content  Cognition: Alert    Evidence of imminent suicide risk: No   Evidence of imminent homicide risk: No     Therapeutic Interventions: Psychoeducation  Progress Toward Treatment Goal: Mild improvement

## 2023-05-19 NOTE — GROUP NOTE
Group Appointment Information    Date: 05/19/23   Attendance Duration: 60 minutes  Number of Participants: 5 participants  Program / Group: IOP - Intensive Outpatient Program  Topics Covered: Care in Relationships      Group Therapy Start Time:  9:00 AM    Attendance: Attended  Participation: Active verbal participation    Affect/Mood Range: Flexible  Affect/Mood Display: CWC - Congruent w/Content  Cognition: Alert    Evidence of imminent suicide risk: No   Evidence of imminent homicide risk: No     Therapeutic Interventions: Interpersonal effectiveness skills  Progress Toward Treatment Goal: Mild improvement Patient puts forth effort to understand the content and to engage in the discussion with other group members. At times, patient struggles with fully comprehending but has learned to ask questions about the topic or word being used in order to gain further understanding. Today was patient's last day in group. He commented that he plans to continue in individual therapy to work on how to change is way of being and thinking about himself. This was a display of some insight that has taken place over the course of the group.

## 2023-05-30 RX ORDER — ESCITALOPRAM OXALATE 10 MG/1
TABLET ORAL
Qty: 30 TABLET | Refills: 0 | Status: SHIPPED | OUTPATIENT
Start: 2023-05-30 | End: 2023-06-22

## 2023-06-01 ENCOUNTER — OFFICE VISIT (OUTPATIENT)
Dept: BEHAVIORAL HEALTH | Facility: CLINIC | Age: 38
End: 2023-06-01
Payer: MEDICARE

## 2023-06-01 DIAGNOSIS — F33.1 MAJOR DEPRESSIVE DISORDER, RECURRENT EPISODE, MODERATE (HCC): ICD-10-CM

## 2023-06-01 DIAGNOSIS — F41.1 GENERALIZED ANXIETY DISORDER: ICD-10-CM

## 2023-06-01 PROCEDURE — 99214 OFFICE O/P EST MOD 30 MIN: CPT | Performed by: PSYCHIATRY & NEUROLOGY

## 2023-06-01 NOTE — PROGRESS NOTES
[facility]   Follow Up Assessment     This provider informed the patient their medical records are totally confidential except for the use by other providers involved in their care, or if the patient signs a release, or to report instances of child or elder abuse, or if it is determined they are an immediate risk to harm themselves or others.    Name: Yazan Hough  MRN: [id number]  : 1985  Age: 38 y.o.  Date of assessment: 2023  PCP: Pcp Pt States None      Subjective: Reviewed prior to seeing him and his mother in my office.  He was last seen on May 5.  He is less anxious and depressed since Prozac was discontinued and he started on Lexapro 5 days ago.  Thus far he has no side effects and would like to increase the dosage to 20 mg at bedtime.  He does enjoy exercising.  His mother is doing well on Lexapro.    Objective:  Ambulates slowly and carefully using a cane.  He is alert, oriented, and cooperative.  Relatedness is good.  Memory is fair.  Insight and judgment are fair.  No indication of psychotic thinking.    Current Risk:       Suicidal: Not suicidal       Homicidal: Not homicidal       Self-Harm: No plan to harm self       Relapse: (Low/Moderate/High): Moderate       Crisis Safety Plan Reviewed: Discussed with patient    Diagnosis:   Major depressive disorder, recurrent  Generalized anxiety disorder    Treatment Plan:  The current treatment plan consists of monthly psychiatric sessions designed to evaluate his depression and anxiety.    Duration will be for a minimum of 12 months and will be reviewed at each visit.    Goals: Remission of depression and control of anxiety in order to prevent relapse due to the chronic nature of his behavioral health problems and mental illness.  Increase Lexapro to 20 mg at bedtime this coming weekend.  Possible side effects including drowsiness discussed.    Ken Cole M.D.      This note was created using voice recognition software  (Blair). The accuracy of the dictation is limited by the abilities of the software. I have reviewed the note prior to signing, however some errors in grammar and context are still possible. If you have any questions related to this note please do not hesitate to contact our office.

## 2023-06-13 ENCOUNTER — OFFICE VISIT (OUTPATIENT)
Dept: BEHAVIORAL HEALTH | Facility: CLINIC | Age: 38
End: 2023-06-13
Payer: MEDICARE

## 2023-06-13 DIAGNOSIS — F43.20 ADJUSTMENT DISORDER, UNSPECIFIED TYPE: ICD-10-CM

## 2023-06-13 PROCEDURE — 90791 PSYCH DIAGNOSTIC EVALUATION: CPT | Performed by: PSYCHOLOGIST

## 2023-06-13 ASSESSMENT — ANXIETY QUESTIONNAIRES
5. BEING SO RESTLESS THAT IT IS HARD TO SIT STILL: NOT AT ALL
1. FEELING NERVOUS, ANXIOUS, OR ON EDGE: NOT AT ALL
6. BECOMING EASILY ANNOYED OR IRRITABLE: SEVERAL DAYS
IF YOU CHECKED OFF ANY PROBLEMS ON THIS QUESTIONNAIRE, HOW DIFFICULT HAVE THESE PROBLEMS MADE IT FOR YOU TO DO YOUR WORK, TAKE CARE OF THINGS AT HOME, OR GET ALONG WITH OTHER PEOPLE: NOT DIFFICULT AT ALL
4. TROUBLE RELAXING: NOT AT ALL
2. NOT BEING ABLE TO STOP OR CONTROL WORRYING: NOT AT ALL
3. WORRYING TOO MUCH ABOUT DIFFERENT THINGS: NOT AT ALL
7. FEELING AFRAID AS IF SOMETHING AWFUL MIGHT HAPPEN: NOT AT ALL
GAD7 TOTAL SCORE: 1

## 2023-06-13 ASSESSMENT — PATIENT HEALTH QUESTIONNAIRE - PHQ9
4. FEELING TIRED OR HAVING LITTLE ENERGY: SEVERAL DAYS
9. THOUGHTS THAT YOU WOULD BE BETTER OFF DEAD, OR OF HURTING YOURSELF: NOT AT ALL
1. LITTLE INTEREST OR PLEASURE IN DOING THINGS: SEVERAL DAYS
SUM OF ALL RESPONSES TO PHQ9 QUESTIONS 1 AND 2: 2
7. TROUBLE CONCENTRATING ON THINGS, SUCH AS READING THE NEWSPAPER OR WATCHING TELEVISION: NOT AT ALL
8. MOVING OR SPEAKING SO SLOWLY THAT OTHER PEOPLE COULD HAVE NOTICED. OR THE OPPOSITE, BEING SO FIGETY OR RESTLESS THAT YOU HAVE BEEN MOVING AROUND A LOT MORE THAN USUAL: NOT AT ALL
SUM OF ALL RESPONSES TO PHQ QUESTIONS 1-9: 5
2. FEELING DOWN, DEPRESSED, IRRITABLE, OR HOPELESS: SEVERAL DAYS
6. FEELING BAD ABOUT YOURSELF - OR THAT YOU ARE A FAILURE OR HAVE LET YOURSELF OR YOUR FAMILY DOWN: MORE THAN HALF THE DAYS
3. TROUBLE FALLING OR STAYING ASLEEP OR SLEEPING TOO MUCH: NOT AT ALL
5. POOR APPETITE OR OVEREATING: NOT AT ALL

## 2023-06-13 NOTE — PROGRESS NOTES
"BEHAVIORAL HEALTH  INITIAL PSYCHOLOGICAL EVALUATION    Name: Yazan Hough   MRN: [id number]   : 1985   Age: 38 y.o.   Date of assessment: 2023   PCP: Pcp Pt States None   Persons in attendance:Patient  Total face-to-face time: 52    Confidentiality and limits reviewed; patient endorsed understanding and desire to continue. Patient's chart reviewed prior to appointment.    CHIEF COMPLAINT AND HISTORY OF PRESENTING PROBLEM:   (As stated by Patient)  Yazan  is a 38 y.o. individual referred for assessment by self. Primary presenting issue includes existential concerns.    \"What's my next step in life? I don't know what to do with my life.\" At age 29, his disease impacted him. He is increasing mobility with scooters. He lives with his parents for the past 2 years. He has a brother who is a . His sister is  and has two daughters, living in [place]. \"I haven't accepted my disease and it's holding me back in life.\" He wants to be out and socialize. With his brother, he is able to go ut because he has support. There is annoyance and vulnerability. He wants to \"find a way to keep going.\" A handicapped person at the dispensary inspired him. He said \"I hate life\" despite trying to be happy.    HISTORY OF PRESENT ILLNESS:      Chart history shows patient has attended [facility].    Additionally, patient was seen by psychiatry on 23 and the following excerpts are relevant to today's visit:    Name: Yazan Hough  MRN: [id number]  : 1985  Age: 38 y.o.  Date of assessment: 2023  PCP: Pcp Pt States None        Subjective: Reviewed prior to seeing him and his mother in my office.  He was last seen on May 5.  He is less anxious and depressed since Prozac was discontinued and he started on Lexapro 5 days ago.  Thus far he has no side effects and would like to increase the dosage to 20 mg at bedtime.  He does enjoy exercising.  His mother is doing well on Lexapro.     Objective:  Ambulates slowly and " carefully using a cane.  He is alert, oriented, and cooperative.  Relatedness is good.  Memory is fair.  Insight and judgment are fair.  No indication of psychotic thinking.     Current Risk:       Suicidal: Not suicidal       Homicidal: Not homicidal       Self-Harm: No plan to harm self       Relapse: (Low/Moderate/High): Moderate       Crisis Safety Plan Reviewed: Discussed with patient     Diagnosis:   Major depressive disorder, recurrent  Generalized anxiety disorder     Treatment Plan:  The current treatment plan consists of monthly psychiatric sessions designed to evaluate his depression and anxiety.     Duration will be for a minimum of 12 months and will be reviewed at each visit.     Goals: Remission of depression and control of anxiety in order to prevent relapse due to the chronic nature of his behavioral health problems and mental illness.  Increase Lexapro to 20 mg at bedtime this coming weekend.  Possible side effects including drowsiness discussed.     Ken Cole M.D.         FAMILY/SOCIAL HISTORY:  Does patient/parent report a family history of behavioral health issues, diagnoses, or treatment?   No family history on file.    Social History     Socioeconomic History    Marital status: Single   Tobacco Use    Smoking status: Some Days     Types: Cigarettes    Smokeless tobacco: Never   Substance and Sexual Activity    Alcohol use: Yes    Drug use: Yes     Types: Inhaled     Comment: marijuana   Social History Narrative    ** Merged History Encounter **           Social Determinants of Health     Tobacco Use: High Risk (6/1/2023)    Patient History     Smoking Tobacco Use: Some Days     Smokeless Tobacco Use: Never     Passive Exposure: Not on file   Alcohol Use: Not on file   Financial Resource Strain: Not on file   Food Insecurity: Not on file   Transportation Needs: Not on file   Physical Activity: Not on file   Stress: Not on file   Social Connections: Not on file   Intimate Partner  Violence: Not on file   Depression: Not at risk (4/21/2023)    PHQ-2     PHQ-2 Score: 0   Housing Stability: Not on file      Relevant family or social history, structure, or dynamics: See above.     PSYCHIATRIC TREATMENT HISTORY:  Does patient/parent report a history of outpatient psychiatric or other behavioral health treatment for patient?   Yes, patient attended [facility] for 5 weeks and also attends psychiatry appointments.                PAST MEDICAL/SURGICAL HISTORY:     No past medical history on file.     Medication Allergies  Allergies   Allergen Reactions    Codeine     Morphine         Medications (non psychiatric)  Current Outpatient Medications on File Prior to Visit   Medication Sig Dispense Refill    escitalopram (LEXAPRO) 10 MG Tab TAKE 1 TABLET BY MOUTH EVERY EVENING 30 Tablet 0    MethylPREDNISolone (MEDROL DOSEPAK) 4 MG Tablet Therapy Pack UAD 1 Kit 0    cyclobenzaprine (FLEXERIL) 5 MG tablet Take 1-2 Tabs by mouth 3 times a day as needed (Avoid while driving.). 21 Tab 0    oxycodone-acetaminophen (PERCOCET) 5-325 MG TABS Take 1-2 Tabs by mouth every four hours as needed for Mild Pain. pain 20 Each 0     No current facility-administered medications on file prior to visit.      No current facility-administered medications for this visit.     LEGAL HISTORY  Has the patient ever been involved with juvenile, adult, or family legal systems? Yes (recent DUI, undergoing process to move forward from this)    ABUSE/NEGLECT SCREENING:  Does patient report feeling “unsafe” in his/her home, or afraid of anyone?  no  Based on the information provided during the current assessment, is a mandated report of suspected abuse/neglect being made?  No    SAFETY ASSESSMENT - SELF  Does patient acknowledge, parent/significant other report, or presenting problem suggest risk of dangerousness to self? No    Crisis Safety Plan completed, documented in chart, and copy given to patient: No     SAFETY ASSESSMENT - OTHERS  Does  patient acknowledge, parent/significant other report, or presenting problem suggest risk of dangerousness to others? No    Crisis Safety Plan completed, documented in chart, and copy given to patient: No      MENTAL STATUS EXAM/OBSERVATIONS  There were no vitals taken for this visit.  Participation:  Active verbal participation, Attentive, Engaged, and Open to feedback  Grooming:  Casual  Orientation: Alert and Fully Oriented  Eye contact: Good  Behavior: Calm (pt walks with cane, slowly and a bit unsteady)  Mood:  Euthymic  Affect: Congruent with content  Thought process: Logical and Goal-directed  Thought content: Within normal limits  Speech: Rate within normal limits and Volume within normal limits  Perception: Within normal limits  Memory:  No gross evidence of memory deficits  Insight:  Good  Judgment:  Good    RESULTS OF SCREENING MEASURES:    Depression Screening    Little interest or pleasure in doing things?   Several days  Feeling down, depressed , or hopeless?  Several days  Trouble falling or staying asleep, or sleeping too much?   Not at all  Feeling tired or having little energy?   Several days  Poor appetite or overeating?   Not at all  Feeling bad about yourself - or that you are a failure or have let yourself or your family down?  More than half the days  Trouble concentrating on things, such as reading the newspaper or watching television?  Not at all  Moving or speaking so slowly that other people could have noticed.  Or the opposite - being so fidgety or restless that you have been moving around a lot more than usual?   Not at all  Thoughts that you would be better off dead, or of hurting yourself?   Not at all  Patient Health Questionnaire Score:  5      If depressive symptoms identified deferred to follow up visit unless specifically addressed in assesment and plan.    Interpretation of PHQ-9 Total Score   Score Severity   1-4 No Depression   5-9 Mild Depression   10-14 Moderate Depression    15-19 Moderately Severe Depression   20-27 Severe Depression    HILARY-7 Questionnaire    Feeling nervous, anxious, or on edge: Not at all  Not being able to sop or control worrying: Not at all  Worrying too much about different things: Not at all  Trouble relaxing: Not at all  Being so restless that it's hard to sit still: Not at all  Becoming easily annoyed or irritable: Several days  Feeling afraid as if something awful might happen: Not at all  Total: 1    Interpretation of HILARY 7 Total Score   Score Severity :  0-4 No Anxiety   5-9 Mild Anxiety  10-14 Moderate Anxiety  15-21 Severe Anxiety      CLINICAL FORMULATION:     Yazan (I-say-us) is a pleasant and cooperative individual establishing therapy to assist with existential concerns about his future. Therapeutic directions would be to explore the possible unresolved Grief / loss with psychoeducation about this; additionally today Pain and three responses discussed. Possible future directions include also Goal setting, ACT.    DIAGNOSTIC IMPRESSION(S): Adjustment disorder, Unspec.    IDENTIFIED NEEDS/PLAN:  [If any of these marked, trigger DISPOSITION list]  Mood/anxiety  Actively being addressed by [facility]    Does patient express agreement with the above plan? Yes    Referral appointment(s) scheduled? Yes    More than 50% of face-to-face time was spent in counseling and coordinating care  Discussed: See above         Paluie Carnes, Ph.D.  Clinical Psychologist  [place] license [id number]

## 2023-06-22 RX ORDER — ESCITALOPRAM OXALATE 10 MG/1
TABLET ORAL
Qty: 30 TABLET | Refills: 0 | Status: SHIPPED | OUTPATIENT
Start: 2023-06-22 | End: 2023-06-29 | Stop reason: SDUPTHER

## 2023-06-29 ENCOUNTER — OFFICE VISIT (OUTPATIENT)
Dept: BEHAVIORAL HEALTH | Facility: CLINIC | Age: 38
End: 2023-06-29
Payer: MEDICARE

## 2023-06-29 DIAGNOSIS — F41.1 GENERALIZED ANXIETY DISORDER: ICD-10-CM

## 2023-06-29 DIAGNOSIS — F33.1 MAJOR DEPRESSIVE DISORDER, RECURRENT EPISODE, MODERATE (HCC): ICD-10-CM

## 2023-06-29 PROCEDURE — 99214 OFFICE O/P EST MOD 30 MIN: CPT | Performed by: PSYCHIATRY & NEUROLOGY

## 2023-06-29 RX ORDER — ESCITALOPRAM OXALATE 10 MG/1
10 TABLET ORAL EVERY EVENING
Qty: 90 TABLET | Refills: 0 | Status: SHIPPED | OUTPATIENT
Start: 2023-06-29 | End: 2023-07-07

## 2023-06-29 NOTE — PROGRESS NOTES
[facility]   Follow Up Assessment     This provider informed the patient their medical records are totally confidential except for the use by other providers involved in their care, or if the patient signs a release, or to report instances of child or elder abuse, or if it is determined they are an immediate risk to harm themselves or others.    Name: Yazan Hough  MRN: [id number]  : 1985  Age: 38 y.o.  Date of assessment: 2023  PCP: Pcp Pt States None      Subjective:  Chart reviewed prior to seeing him and his mother in my office.  Both agree that he has been feeling less anxious and depressed on the increased dose of Lexapro 20 mg at bedtime.  He does enjoy exercising.  He sees  for psychotherapy.    Objective: He arrived in a wheelchair.  He is alert, oriented, and cooperative.  Relatedness is good.  Less anxious and depressed.  Memory is fair.  Insight and judgment are fair.  No indication of psychotic thinking.    Current Risk:       Suicidal: Not suicidal       Homicidal: Not homicidal       Self-Harm: No plan to harm self       Relapse: (Low/Moderate/High): Moderate       Crisis Safety Plan Reviewed: Discussed with patient    Diagnosis:   Major depressive disorder, recurrent  Generalized anxiety disorder    Treatment Plan:  The current treatment plan consists of quarterly psychiatric sessions designed to evaluate his depression and anxiety.    Duration will be for a minimum of 12 months and will be reviewed at each visit.    Goals: Remission of depression and control of anxiety in order to prevent relapse due to the chronic nature of his behavioral health problems and mental illness.  Continue Lexapro 20 mg at bedtime.    Ken Cole M.D.      This note was created using voice recognition software (Dragon). The accuracy of the dictation is limited by the abilities of the software. I have reviewed the note prior to signing, however some errors in grammar and  context are still possible. If you have any questions related to this note please do not hesitate to contact our office.

## 2023-07-07 RX ORDER — ESCITALOPRAM OXALATE 20 MG/1
20 TABLET ORAL DAILY
Qty: 90 TABLET | Refills: 0 | Status: SHIPPED | OUTPATIENT
Start: 2023-07-07 | End: 2023-10-05

## 2023-08-15 ENCOUNTER — OFFICE VISIT (OUTPATIENT)
Dept: BEHAVIORAL HEALTH | Facility: CLINIC | Age: 38
End: 2023-08-15
Payer: MEDICARE

## 2023-08-15 DIAGNOSIS — F33.1 MAJOR DEPRESSIVE DISORDER, RECURRENT EPISODE, MODERATE (HCC): ICD-10-CM

## 2023-08-15 DIAGNOSIS — F10.10 ALCOHOL ABUSE, EPISODIC DRINKING BEHAVIOR: ICD-10-CM

## 2023-08-15 DIAGNOSIS — F43.20 ADJUSTMENT DISORDER, UNSPECIFIED TYPE: ICD-10-CM

## 2023-08-15 DIAGNOSIS — F41.1 GENERALIZED ANXIETY DISORDER: ICD-10-CM

## 2023-08-15 PROCEDURE — 90834 PSYTX W PT 45 MINUTES: CPT | Performed by: PSYCHOLOGIST

## 2023-08-15 NOTE — PROGRESS NOTES
"[facility]   Therapy Progress Note    Name: Yazan Huogh  MRN: [id number]  : 1985  Age: 38 y.o.  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 His illness struck when he was working with [employer].     Currently he smokes cannabis to relax and take his mind off of things. He will use this for 5 hours per day. He weighs going to work, but if he does this he may reduce his disability income.     Anxiety and Productivity chart was sketched to explore Yazan' current satisfaction with his activity level. This seemed motivating to him    We also discussed how staying happy is \"getting me through\" and also how being in touch with some negativity can be helpful in motivating people for change.    Next session we may explore a pro's and con's list to help him consider potential changes.     Objective Observations:   Participation:Active verbal participation, Attentive, Engaged, and Open to feedback   Grooming:Casual   Cognition:Alert and Fully Oriented   Eye Contact:Good   Mood:Euthymic and Happy   Affect:Congruent with content   Thought Process:Logical and Goal-directed   Speech: Yazan tends to speak deliberately with a higher-than-average volume, which may be a result of his disease.    Current Risk:   Suicide: low   Homicide: low   Self-Harm: low   Relapse: low   Safety Plan Reviewed: not applicable    Care Plan Updated: No    Does patient express agreement with the above plan? Yes     Diagnosis:  1. Major depressive disorder, recurrent episode, moderate (HCC)    2. Generalized anxiety disorder    3. Adjustment disorder, unspecified type    4. Alcohol abuse, episodic drinking behavior        Referral appointment(s) scheduled? Yes       Paulie Carnes, Ph.D.    "

## 2023-09-12 ENCOUNTER — OFFICE VISIT (OUTPATIENT)
Dept: BEHAVIORAL HEALTH | Facility: CLINIC | Age: 38
End: 2023-09-12
Payer: MEDICARE

## 2023-09-12 DIAGNOSIS — F43.20 ADJUSTMENT DISORDER, UNSPECIFIED TYPE: ICD-10-CM

## 2023-09-12 DIAGNOSIS — F41.1 GENERALIZED ANXIETY DISORDER: ICD-10-CM

## 2023-09-12 DIAGNOSIS — F33.1 MAJOR DEPRESSIVE DISORDER, RECURRENT EPISODE, MODERATE (HCC): ICD-10-CM

## 2023-09-12 PROCEDURE — 90834 PSYTX W PT 45 MINUTES: CPT | Performed by: PSYCHOLOGIST

## 2023-09-12 NOTE — PROGRESS NOTES
"[facility]   Therapy Progress Note        Name: Yazan Hough  MRN: [id number]  : 1985  Age: 38 y.o.  Date of assessment: 2023  PCP: Pcp Pt States None  Persons in attendance: Patient  Total session time: 40 minutes      Topics addressed in psychotherapy include: \"Inner demons\" are keeping him from progressing in how his day is progressing. A couple days ago Yazan went out with his brother and had a good time. \"That's all I have right now.\" He is looking forward to driving again. He has to pass a test. He has been stuck at home for 6-9 months. He wrestles with negative thoughts. He would like to take advanced art classes. He has a test on the  for driving. Last time he went too fast in a school zone. He is now doing it in [place] and is researching the area for speed limits. At our next session he will have researched options at [facility]. Valued Directions provided.    Objective Observations:   Participation:Active verbal participation, Attentive, Engaged, and Open to feedback   Grooming:Casual   Cognition:Alert and Fully Oriented   Eye Contact:Good   Mood:Happy   Affect:Congruent with content   Thought Process:Logical and Goal-directed   Speech:Rate within normal limits and Volume within normal limits    Current Risk:   Suicide: low   Homicide: low   Self-Harm: low   Relapse: low   Safety Plan Reviewed: not applicable    Care Plan Updated: No    Does patient express agreement with the above plan? Yes     Diagnosis:  1. Major depressive disorder, recurrent episode, moderate (HCC)    2. Generalized anxiety disorder    3. Adjustment disorder, unspecified type        Referral appointment(s) scheduled? Yes       Paulie Carnes, Ph.D.    "

## 2023-09-20 ENCOUNTER — HOSPITAL ENCOUNTER (OUTPATIENT)
Dept: RADIOLOGY | Facility: MEDICAL CENTER | Age: 38
End: 2023-09-20
Attending: SPECIALIST
Payer: MEDICARE

## 2023-09-20 ENCOUNTER — ANESTHESIA EVENT (OUTPATIENT)
Dept: RADIOLOGY | Facility: MEDICAL CENTER | Age: 38
End: 2023-09-20
Payer: MEDICARE

## 2023-09-20 ENCOUNTER — ANESTHESIA (OUTPATIENT)
Dept: RADIOLOGY | Facility: MEDICAL CENTER | Age: 38
End: 2023-09-20
Payer: MEDICARE

## 2023-09-20 VITALS
RESPIRATION RATE: 16 BRPM | WEIGHT: 192.24 LBS | OXYGEN SATURATION: 100 % | BODY MASS INDEX: 28.47 KG/M2 | SYSTOLIC BLOOD PRESSURE: 106 MMHG | HEART RATE: 62 BPM | DIASTOLIC BLOOD PRESSURE: 57 MMHG | HEIGHT: 69 IN | TEMPERATURE: 96.8 F

## 2023-09-20 DIAGNOSIS — G11.4 SPASTIC PARAPLEGIA, HEREDITARY (HCC): ICD-10-CM

## 2023-09-20 PROCEDURE — 700111 HCHG RX REV CODE 636 W/ 250 OVERRIDE (IP)

## 2023-09-20 PROCEDURE — 72156 MRI NECK SPINE W/O & W/DYE: CPT

## 2023-09-20 PROCEDURE — 700101 HCHG RX REV CODE 250: Performed by: ANESTHESIOLOGY

## 2023-09-20 PROCEDURE — 70553 MRI BRAIN STEM W/O & W/DYE: CPT

## 2023-09-20 PROCEDURE — 700111 HCHG RX REV CODE 636 W/ 250 OVERRIDE (IP): Performed by: ANESTHESIOLOGY

## 2023-09-20 PROCEDURE — 700117 HCHG RX CONTRAST REV CODE 255: Performed by: SPECIALIST

## 2023-09-20 PROCEDURE — A9579 GAD-BASE MR CONTRAST NOS,1ML: HCPCS | Performed by: SPECIALIST

## 2023-09-20 PROCEDURE — 72157 MRI CHEST SPINE W/O & W/DYE: CPT

## 2023-09-20 PROCEDURE — 700105 HCHG RX REV CODE 258: Performed by: ANESTHESIOLOGY

## 2023-09-20 RX ORDER — BACLOFEN 10 MG/1
10 TABLET ORAL 3 TIMES DAILY
COMMUNITY
End: 2024-03-26

## 2023-09-20 RX ORDER — HYDRALAZINE HYDROCHLORIDE 20 MG/ML
5 INJECTION INTRAMUSCULAR; INTRAVENOUS
Status: DISCONTINUED | OUTPATIENT
Start: 2023-09-20 | End: 2023-09-21 | Stop reason: HOSPADM

## 2023-09-20 RX ORDER — SODIUM CHLORIDE, SODIUM LACTATE, POTASSIUM CHLORIDE, CALCIUM CHLORIDE 600; 310; 30; 20 MG/100ML; MG/100ML; MG/100ML; MG/100ML
INJECTION, SOLUTION INTRAVENOUS
Status: DISCONTINUED | OUTPATIENT
Start: 2023-09-20 | End: 2023-09-20 | Stop reason: SURG

## 2023-09-20 RX ORDER — EPHEDRINE SULFATE 50 MG/ML
5 INJECTION, SOLUTION INTRAVENOUS
Status: DISCONTINUED | OUTPATIENT
Start: 2023-09-20 | End: 2023-09-21 | Stop reason: HOSPADM

## 2023-09-20 RX ORDER — SODIUM CHLORIDE, SODIUM LACTATE, POTASSIUM CHLORIDE, CALCIUM CHLORIDE 600; 310; 30; 20 MG/100ML; MG/100ML; MG/100ML; MG/100ML
INJECTION, SOLUTION INTRAVENOUS CONTINUOUS
Status: DISCONTINUED | OUTPATIENT
Start: 2023-09-20 | End: 2023-09-21 | Stop reason: HOSPADM

## 2023-09-20 RX ORDER — MEPERIDINE HYDROCHLORIDE 25 MG/ML
INJECTION INTRAMUSCULAR; INTRAVENOUS; SUBCUTANEOUS PRN
Status: DISCONTINUED | OUTPATIENT
Start: 2023-09-20 | End: 2023-09-20 | Stop reason: SURG

## 2023-09-20 RX ORDER — LIDOCAINE HYDROCHLORIDE 20 MG/ML
INJECTION, SOLUTION EPIDURAL; INFILTRATION; INTRACAUDAL; PERINEURAL PRN
Status: DISCONTINUED | OUTPATIENT
Start: 2023-09-20 | End: 2023-09-20 | Stop reason: SURG

## 2023-09-20 RX ORDER — METHOCARBAMOL 500 MG/1
1000 TABLET, FILM COATED ORAL 4 TIMES DAILY
COMMUNITY
End: 2024-03-26 | Stop reason: SDUPTHER

## 2023-09-20 RX ORDER — MEPERIDINE HYDROCHLORIDE 25 MG/ML
INJECTION INTRAMUSCULAR; INTRAVENOUS; SUBCUTANEOUS
Status: COMPLETED
Start: 2023-09-20 | End: 2023-09-20

## 2023-09-20 RX ORDER — LABETALOL HYDROCHLORIDE 5 MG/ML
5 INJECTION, SOLUTION INTRAVENOUS
Status: DISCONTINUED | OUTPATIENT
Start: 2023-09-20 | End: 2023-09-21 | Stop reason: HOSPADM

## 2023-09-20 RX ORDER — ONDANSETRON 2 MG/ML
4 INJECTION INTRAMUSCULAR; INTRAVENOUS
Status: DISCONTINUED | OUTPATIENT
Start: 2023-09-20 | End: 2023-09-21 | Stop reason: HOSPADM

## 2023-09-20 RX ADMIN — EPHEDRINE SULFATE 10 MG: 50 INJECTION, SOLUTION INTRAVENOUS at 08:32

## 2023-09-20 RX ADMIN — GLYCOPYRROLATE 0.1 MG: 0.2 INJECTION INTRAMUSCULAR; INTRAVENOUS at 08:21

## 2023-09-20 RX ADMIN — LIDOCAINE HYDROCHLORIDE 100 MG: 20 INJECTION, SOLUTION EPIDURAL; INFILTRATION; INTRACAUDAL at 07:54

## 2023-09-20 RX ADMIN — MEPERIDINE HYDROCHLORIDE 25 MG: 25 INJECTION INTRAMUSCULAR; INTRAVENOUS; SUBCUTANEOUS at 10:05

## 2023-09-20 RX ADMIN — GADOTERIDOL 20 ML: 279.3 INJECTION, SOLUTION INTRAVENOUS at 09:28

## 2023-09-20 RX ADMIN — EPHEDRINE SULFATE 10 MG: 50 INJECTION, SOLUTION INTRAVENOUS at 08:18

## 2023-09-20 RX ADMIN — PROPOFOL 100 MG: 10 INJECTION, EMULSION INTRAVENOUS at 07:56

## 2023-09-20 RX ADMIN — SODIUM CHLORIDE, POTASSIUM CHLORIDE, SODIUM LACTATE AND CALCIUM CHLORIDE: 600; 310; 30; 20 INJECTION, SOLUTION INTRAVENOUS at 07:47

## 2023-09-20 RX ADMIN — EPHEDRINE SULFATE 10 MG: 50 INJECTION, SOLUTION INTRAVENOUS at 09:15

## 2023-09-20 RX ADMIN — PROPOFOL 100 MG: 10 INJECTION, EMULSION INTRAVENOUS at 07:57

## 2023-09-20 RX ADMIN — PROPOFOL 200 MG: 10 INJECTION, EMULSION INTRAVENOUS at 07:55

## 2023-09-20 ASSESSMENT — FIBROSIS 4 INDEX
FIB4 SCORE: 0.53
FIB4 SCORE: 0.53

## 2023-09-20 ASSESSMENT — PAIN SCALES - GENERAL: PAINLEVEL: NO PAIN

## 2023-09-20 NOTE — ANESTHESIA POSTPROCEDURE EVALUATION
Patient: Yazan Hough III    Procedure Summary     Date: 09/20/23 Room / Location: [facility] - MRI - [address]    Anesthesia Start: 0747 Anesthesia Stop: 1011    Procedure: MR-THORACIC SPINE-WITH & W/O Diagnosis: Spastic paraplegia, hereditary (HCC)    Scheduled Providers: Geoffrey Rodriguez M.D. Responsible Provider: Geoffrey Rodriguez M.D.    Anesthesia Type: general ASA Status: 2          Final Anesthesia Type: general  Last vitals  BP        Temp        Pulse       Resp        SpO2          Anesthesia Post Evaluation    Patient location during evaluation: PACU  Patient participation: complete - patient participated  Level of consciousness: awake and alert    Airway patency: patent  Anesthetic complications: no  Cardiovascular status: hemodynamically stable  Respiratory status: acceptable  Hydration status: euvolemic    PONV: none          No notable events documented.     Nurse Pain Score: 0 (NPRS)

## 2023-09-20 NOTE — ANESTHESIA PROCEDURE NOTES
Airway    Date/Time: 9/20/2023 7:57 AM    Performed by: Geoffrey Rodriguez M.D.  Authorized by: Geoffrey Rodriguez M.D.    Location:  OR  Urgency:  Elective  Difficult Airway: No    Indications for Airway Management:  Anesthesia      Spontaneous Ventilation: absent    Sedation Level:  Deep  Preoxygenated: Yes    Mask Difficulty Assessment:  0 - not attempted  Final Airway Type:  Supraglottic airway  Final Supraglottic Airway:  Standard LMA    SGA Size:  4  Number of Attempts at Approach:  1

## 2023-09-20 NOTE — ANESTHESIA PREPROCEDURE EVALUATION
Date/Time: 09/20/23 0745    Scheduled providers: Geoffrey Rodriguez M.D.    Procedure: MR-THORACIC SPINE-WITH & W/O    Diagnosis: Spastic paraplegia, hereditary (HCC) [G11.4]    Location: [facility] - MRI - [address]      39 y/o with history of hereditary spastic paraplegia.  Patient has progressive neurologic loss of strength over last 5-6 years.  He is currently mainly in a wheelchair, still exercising daily to the best of his ability.     Relevant Problems   Other   (positive) Alcohol abuse, episodic drinking behavior   (positive) Generalized anxiety disorder   (positive) Major depressive disorder, recurrent episode, moderate (HCC)       Physical Exam    Airway   Mallampati: II  TM distance: >3 FB  Neck ROM: full       Cardiovascular - normal exam  Rhythm: regular  Rate: normal  (-) murmur     Dental - normal exam           Pulmonary - normal exam  Breath sounds clear to auscultation     Abdominal    Neurological - normal exam                 Anesthesia Plan    ASA 3 (heredetary spastic paraplegia)       Plan - general       Airway plan will be LMA          Induction: intravenous      Pertinent diagnostic labs and testing reviewed    Informed Consent:    Anesthetic plan and risks discussed with patient.

## 2023-09-20 NOTE — ANESTHESIA TIME REPORT
Anesthesia Start and Stop Event Times     Date Time Event    9/20/2023 0745 Ready for Procedure     0747 Anesthesia Start     1011 Anesthesia Stop        Responsible Staff  09/20/23    Name Role Begin End    Geoffrey Rodriguez M.D. Anesth 0747 1011        Overtime Reason:  no overtime (within assigned shift)    Comments:

## 2023-09-20 NOTE — PROGRESS NOTES
Patient to MRI Outpatient Dept.  Patient informed process and plan of care during this visit.  Anesthesiologist, Dr Rodriguez spoke with patient and discussed provider's plan of care.  MRI brain, cervical spine, and thoracic spine with and without contrast under general anesthesia completed.  Patient taken to recovery.  VSS, PIV removed.  DC instructions discussed, all questions answered.  Patient discharged in stable condition with responsible adult, mother Lili.

## 2023-09-20 NOTE — DISCHARGE INSTRUCTIONS
MRI ADULT DISCHARGE INSTRUCTIONS    You have been medicated today for your scan. Please follow the instructions below to ensure your safe recovery. If you have any questions or problems, feel free to call us at [phone] or [phone].     1.   Have someone stay with you to assist you as needed.    2.   Do not drive or operate any mechanical devices.    3.   Do not perform any activity that requires concentration. Make no major decisions over the next 24 hours.     4.   Be careful changing positions from laying down to sitting or standing, as you may become dizzy.     5.   Do not drink alcohol for 48 hours.    6.   There are no restrictions for eating your normal meals. Drink fluids.    7.   You may continue your usual medications for pain, tranquilizers, muscle relaxants or sedatives when awake.     8.   Tomorrow, you may continue your normal daily activities.     9.   Pressure dressing on 10 - 15 minutes. If swelling or bleeding occurs when removed, continue placing direct pressure on injection site for another 5 minutes, or until bleeding stops.     I have been informed of and understand the above discharge instructions.

## 2023-09-28 ENCOUNTER — OFFICE VISIT (OUTPATIENT)
Dept: BEHAVIORAL HEALTH | Facility: CLINIC | Age: 38
End: 2023-09-28
Payer: MEDICARE

## 2023-09-28 DIAGNOSIS — F33.1 MAJOR DEPRESSIVE DISORDER, RECURRENT EPISODE, MODERATE (HCC): ICD-10-CM

## 2023-09-28 DIAGNOSIS — F51.01 PRIMARY INSOMNIA: ICD-10-CM

## 2023-09-28 DIAGNOSIS — F41.1 GENERALIZED ANXIETY DISORDER: ICD-10-CM

## 2023-09-28 PROCEDURE — 99214 OFFICE O/P EST MOD 30 MIN: CPT | Performed by: PSYCHIATRY & NEUROLOGY

## 2023-09-28 RX ORDER — ESCITALOPRAM OXALATE 20 MG/1
20 TABLET ORAL NIGHTLY
Qty: 180 TABLET | Refills: 0 | Status: SHIPPED | OUTPATIENT
Start: 2023-09-28 | End: 2023-12-08 | Stop reason: SDUPTHER

## 2023-09-28 NOTE — PROGRESS NOTES
[facility]   Follow Up Assessment     This provider informed the patient their medical records are totally confidential except for the use by other providers involved in their care, or if the patient signs a release, or to report instances of child or elder abuse, or if it is determined they are an immediate risk to harm themselves or others.    Name: Yazan Hough III  MRN: [id number]  : 1985  Age: 38 y.o.  Date of assessment: 2023  PCP: Pcp Pt States None      Subjective: Reviewed prior to seeing him and his mother in my office.  He was last seen on .  Both indicate that he is doing well on Lexapro 20 mg at bedtime.  He has used melatonin to help with insomnia.  We talked briefly about trazodone if melatonin is not adequate for insomnia.    Objective:  He is able to maneuver his wheelchair.  He is alert, oriented, and cooperative.  Relatedness is good.  Grooming is good.  Speech is normal rate.  Less depressed.  Memory is fair.  Insight and judgment are fair.  No indication of psychotic thinking.    Current Risk:       Suicidal: Not suicidal       Homicidal: Not homicidal       Self-Harm: No plan to harm self       Relapse: (Low/Moderate/High): Moderate       Crisis Safety Plan Reviewed: Tona with patient    Diagnosis:   Major depressive disorder, recurrent  Generalized anxiety disorder  Insomnia    Treatment Plan:  The current treatment plan consists of follow-up visits every 6 months designed to evaluate his depression, anxiety, and insomnia.    Duration will be for a minimum of 12 months and will be reviewed at each visit.    Goals: Remission of depression with control of anxiety and improvement in sleep in order to prevent relapse due to the chronic nature of his behavioral health problems and mental illness.  Continue Lexapro 20 mg at bedtime.    Ken Cole M.D.      This note was created using voice recognition software (Dragon). The accuracy of the  dictation is limited by the abilities of the software. I have reviewed the note prior to signing, however some errors in grammar and context are still possible. If you have any questions related to this note please do not hesitate to contact our office.

## 2023-10-10 ENCOUNTER — OFFICE VISIT (OUTPATIENT)
Dept: BEHAVIORAL HEALTH | Facility: CLINIC | Age: 38
End: 2023-10-10
Payer: MEDICARE

## 2023-10-10 DIAGNOSIS — F43.20 ADJUSTMENT DISORDER, UNSPECIFIED TYPE: ICD-10-CM

## 2023-10-10 DIAGNOSIS — F33.1 MAJOR DEPRESSIVE DISORDER, RECURRENT EPISODE, MODERATE (HCC): ICD-10-CM

## 2023-10-10 DIAGNOSIS — F41.1 GENERALIZED ANXIETY DISORDER: ICD-10-CM

## 2023-10-10 PROCEDURE — 90837 PSYTX W PT 60 MINUTES: CPT | Performed by: PSYCHOLOGIST

## 2023-10-10 NOTE — PROGRESS NOTES
"[facility]   Therapy Progress Note      Name: Yazan Hough III  MRN: [id number]  : 1985  Age: 38 y.o.  Date of assessment: 10/10/2023  PCP: Pcp Pt States None  Persons in attendance: Patient  Total session time: 56 minutes      Topics addressed in psychotherapy include: Hereditary spastic parapalegia. When it first hit he did not expect it. His sister has it as well. This hit in 2018. They did not know this was in their family. He and his sister were active with sports. Sister has two daughters. She is 42. It hit her in her late twenties, he had this in his early thirties. Her symptoms are worse. When it first happened to Yazan, he \"stayed quiet\" and \"stuck to myself.\" There was some uncertainty but he realized he had the condition and said \"my life's going to change.\" When driving, he was \"caught\" looking at his phone and booked for driving under the influence. Asked about his earlier years, before becoming \"quiet\" he had some struggles with recalling these years. He had just got a new job, was happy. He showed up on time doing good work. He was clearing debris from ditches, and cleaned houses. \"My legs were everything.\" He kept working despite the leg issues. His legs would not give out but would ache / hurt. He would push through the pain. He does not recall the turning point or how he came to the conclusion he should stop working. Prior to this he worked at [employer]. Life is like \"tumbling down a hill.\"    He had a tough childhood prior to his parents . He enjoyed playing sports (football, baseball, wrestling). This was \"the best time in my life.\"    He failed his driving test, feeling rushed. The instructor \"felt comfortable\" but caught him on 4-5 minor things that added up.     He could \"do more\" in life prior to this illness causing a traumatic change in life.     He does want to get into art classes at [employer] but is not quite ready to pursue this due to problems with " "his driving test.    He's been trying so tell himself \"I'm just trying to make it work\" but feels \"like a rock tumbling down hill\" and is uncertain how to make it work.    He expresses confusion over his next steps, making the most of the situation given limitations. Seeking clarity over what he can accomplish (with work, relationship, etc) would be helpful. We talked about developing specifics (eg instead of being solely positive \"I can pass my test\" having specifics such as \"which points did I get marked down for on my driving test?\").    Objective Observations:   Participation:Active verbal participation, Attentive, Engaged, and Open to feedback   Grooming:Casual   Cognition:Alert and Fully Oriented   Eye Contact:Good   Mood:Euthymic   Affect:Congruent with content   Thought Process:Logical and Goal-directed   Speech:Rate within normal limits, Volume within normal limits, and Deliberate Pronunciation    Current Risk:   Suicide: low   Homicide: low   Self-Harm: low   Relapse: low   Safety Plan Reviewed: not applicable    Care Plan Updated: No    Does patient express agreement with the above plan? Yes     Diagnosis:  1. Major depressive disorder, recurrent episode, moderate (HCC)    2. Generalized anxiety disorder    3. Adjustment disorder, unspecified type        Referral appointment(s) scheduled? Yes       Paulie Carnes, Ph.D.    "

## 2023-11-17 ENCOUNTER — APPOINTMENT (OUTPATIENT)
Dept: BEHAVIORAL HEALTH | Facility: CLINIC | Age: 38
End: 2023-11-17
Payer: MEDICARE

## 2023-11-28 NOTE — GROUP NOTE
Group Appointment Information    Date: 05/19/23   Attendance Duration: 60 minutes  Number of Participants: 4 participants  Program / Group: IOP - Intensive Outpatient Program  Topics Covered: Boundaries      Group Therapy Start Time: 10:00 AM    Attendance: Attended  Participation: Active verbal participation    Affect/Mood Range: Flexible  Affect/Mood Display: CWC - Congruent w/Content  Cognition: Alert    Evidence of imminent suicide risk: No   Evidence of imminent homicide risk: No     Therapeutic Interventions: Communication skills  Progress Toward Treatment Goal: Mild improvement   English

## 2023-12-08 NOTE — TELEPHONE ENCOUNTER
Received request via: Pharmacy    Was the patient seen in the last year in this department? Yes    Does the patient have an active prescription (recently filled or refills available) for medication(s) requested? No    Does the patient have halfway Plus and need 100 day supply (blood pressure, diabetes and cholesterol meds only)? Medication is not for cholesterol, blood pressure or diabetes and Patient does not have SCP

## 2023-12-11 RX ORDER — ESCITALOPRAM OXALATE 20 MG/1
20 TABLET ORAL NIGHTLY
Qty: 90 TABLET | Refills: 0 | Status: SHIPPED | OUTPATIENT
Start: 2023-12-11 | End: 2024-03-10

## 2023-12-14 ENCOUNTER — OFFICE VISIT (OUTPATIENT)
Dept: BEHAVIORAL HEALTH | Facility: CLINIC | Age: 38
End: 2023-12-14
Payer: MEDICARE

## 2023-12-14 DIAGNOSIS — F41.1 GENERALIZED ANXIETY DISORDER: ICD-10-CM

## 2023-12-14 DIAGNOSIS — F43.20 ADJUSTMENT DISORDER, UNSPECIFIED TYPE: ICD-10-CM

## 2023-12-14 DIAGNOSIS — F33.1 MAJOR DEPRESSIVE DISORDER, RECURRENT EPISODE, MODERATE (HCC): ICD-10-CM

## 2023-12-14 PROCEDURE — 90834 PSYTX W PT 45 MINUTES: CPT | Performed by: PSYCHOLOGIST

## 2023-12-14 NOTE — PROGRESS NOTES
"[facility]   Therapy Progress Note    Name: Yazan Hough III  MRN: [id number]  : 1985  Age: 38 y.o.  Date of assessment: 2023  PCP: Pcp Pt States None  Persons in attendance: Patient  Total session time: 45 minutes    Topics addressed in psychotherapy include: In early January () or late December () he can make more progress towards removing the blower from his car and may be able to get his license back. He is thinking about going back to school and studying business. Until he has this freedom and flexibility, he is reading the [employer] book. It is important for him to both pass the DMV test and get his license back. He also is spending time with his brother about once per week. His family does follow the stock market. He had gotten in trouble in March. He prior had \"jumped around jobs to find something that suits me.\" After his [employer] job ended (due to his illness) he struggled to find productive ways to spend time. The [employer] job ended towards the end of . He had lived with his brother for several years before this. He recalled even as a child he did not have a clear idea of what he wanted to do for a career. He had played sports in childhood. He also mentioned having a learning disability in English and Math. He went to \"[employer]\" until Sophomore year. His parents . \"I had a crazy / party childhood\" after age [age]. 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 We discussed his \"trajectory\" of life / career prior to the interruptions of the illness and COVID and losing his car / license. We discussed towards the end of the session the idea of \"reinventing\" himself in light of his limitations and what's available. Transfer internally discussed.    Objective Observations:   Participation:Active verbal participation, Attentive, Engaged, and Open to " feedback   Grooming:Casual   Cognition:Alert and Fully Oriented   Eye Contact:Good   Mood:Euthymic   Affect:Congruent with content   Thought Process:Logical and Goal-directed   Speech:Rate within normal limits and Volume within normal limits    Current Risk:   Suicide: low   Homicide: low   Self-Harm: low   Relapse: low   Safety Plan Reviewed: not applicable    Care Plan Updated: No    Does patient express agreement with the above plan? Yes     Diagnosis:  1. Major depressive disorder, recurrent episode, moderate (HCC)    2. Generalized anxiety disorder    3. Adjustment disorder, unspecified type        Referral appointment(s) scheduled? Yes       Paulie Carnes, Ph.D.

## 2024-01-09 ENCOUNTER — OFFICE VISIT (OUTPATIENT)
Dept: BEHAVIORAL HEALTH | Facility: CLINIC | Age: 39
End: 2024-01-09
Payer: MEDICARE

## 2024-01-09 DIAGNOSIS — F43.20 ADJUSTMENT DISORDER, UNSPECIFIED TYPE: ICD-10-CM

## 2024-01-09 DIAGNOSIS — F41.1 GENERALIZED ANXIETY DISORDER: ICD-10-CM

## 2024-01-09 DIAGNOSIS — F33.1 MAJOR DEPRESSIVE DISORDER, RECURRENT EPISODE, MODERATE (HCC): ICD-10-CM

## 2024-01-09 PROCEDURE — 90834 PSYTX W PT 45 MINUTES: CPT | Performed by: PSYCHIATRY & NEUROLOGY

## 2024-01-09 ASSESSMENT — PATIENT HEALTH QUESTIONNAIRE - PHQ9: CLINICAL INTERPRETATION OF PHQ2 SCORE: 0

## 2024-01-09 NOTE — PROGRESS NOTES
[facility]   Psychotherapy Progress Note    Therapy was provided on this date in coordination with the [facility] approved Clinical Supervisor under the direct supervision of Dr. Ken Cole who was on site during this visit.     Name: Yazan Hough III  MRN: [id number]  : 1985  Age: 38 y.o.  Date of assessment: 2024  PCP: Pcp Pt States None  Persons in attendance: Patient  Total session time: 50 minutes      Topics addressed in psychotherapy include: Introductory session, Yazan reports difficulties in learning about his genetic condition that is causing weakening/ degenerative muscles and use of a knee rest scooter to get around.  The impact of his DUI from March and he is going to take a drivers test to get his license back this month, revoked due to newly developed handicap.  We discussed his sobriety, 1 year in March and birthday in March that he may want to celebrate by having 1 or several beers with his dad.  Supportive psychotherapy provided. Depression screening scores zero based on response to first two questions.         2023     8:00 AM 2023     4:32 PM 2024    10:00 AM   Depression Screen (PHQ-2/PHQ-9)   PHQ-2 Total Score  2    PHQ-2 Total Score 0  0   PHQ-9 Total Score  5        Interpretation of PHQ-9 Total Score   Score Severity   1-4 No Depression   5-9 Mild Depression   10-14 Moderate Depression   15-19 Moderately Severe Depression   20-27 Severe Depression     Objective Observations:   Participation:Active verbal participation, Attentive, Engaged, and Open to feedback   Grooming:Casual and Neat   Cognition:Alert and Fully Oriented   Eye Contact:Good   Mood:Euthymic   Affect:Flexible, Expansive, and Congruent with content   Thought Process:Logical and Goal-directed   Speech:Rate within normal limits and Volume within normal limits    Current Risk:   Suicide: low   Homicide: low   Self-Harm: low      Care Plan Updated: Yes    Does patient express  agreement with the above plan? Yes     Diagnosis:  1. Major depressive disorder, recurrent episode, moderate (HCC)    2. Generalized anxiety disorder    3. Adjustment disorder, unspecified type        Referral appointment(s) scheduled? No       BEE [facility] Intern

## 2024-01-19 ENCOUNTER — TELEPHONE (OUTPATIENT)
Dept: HEALTH INFORMATION MANAGEMENT | Facility: OTHER | Age: 39
End: 2024-01-19
Payer: MEDICARE

## 2024-02-14 ENCOUNTER — OFFICE VISIT (OUTPATIENT)
Dept: BEHAVIORAL HEALTH | Facility: CLINIC | Age: 39
End: 2024-02-14
Payer: MEDICARE

## 2024-02-14 DIAGNOSIS — F33.1 MAJOR DEPRESSIVE DISORDER, RECURRENT EPISODE, MODERATE (HCC): ICD-10-CM

## 2024-02-14 DIAGNOSIS — F41.1 GENERALIZED ANXIETY DISORDER: ICD-10-CM

## 2024-02-14 PROCEDURE — 90834 PSYTX W PT 45 MINUTES: CPT | Performed by: MARRIAGE & FAMILY THERAPIST

## 2024-02-14 NOTE — PROGRESS NOTES
[facility]   Psychotherapy Progress Note    Therapy was provided on this date in coordination with the [facility] approved Clinical Supervisor under the direct supervision of Dr. Ken Cole who was on site during this visit.     Name: Yazan Hough III  MRN: [id number]  : 1985  Age: 38 y.o.  Date of assessment: 2024  PCP: Pcp Pt States None  Persons in attendance: Patient  Total session time: 50 minutes      Topics addressed in psychotherapy include: Yazan reports that he has started a new antidepressant 2 weeks ago, and he is feeling better already and it seems to block some of the nerve/ pain receptors in his leg so he feels better physically as well.  He noted that sometimes he experiences automatic negative thoughts. We discussed what the most prominent thoughts are and we identified CBT tools to challenge those ANTS and replace them with positive and hopeful thoughts to improve wellbeing and reduce severity of depression.  Yazan also identifies as feeling down because he can't get a job or get in a relationship. We addressed the negative core beliefs that prevent him from pursuing each of these goals and gave Yazan ways to view his contributions to a job or a relationship in a more positive light.     Objective Observations:   Participation:Active verbal participation, Attentive, Engaged, and Open to feedback   Grooming:Casual and Neat   Cognition:Alert and Fully Oriented   Eye Contact:Good   Mood:Euthymic   Affect:Flexible, Full range, Expansive, and Congruent with content   Thought Process:Logical and Goal-directed   Speech:Rate within normal limits and Volume within normal limits    Current Risk:   Suicide: low   Homicide: low   Self-Harm: low     Care Plan Updated: Yes    Does patient express agreement with the above plan? Yes     Diagnosis:  1. Major depressive disorder, recurrent episode, moderate (HCC)    2. Generalized anxiety disorder        Referral  appointment(s) scheduled? No       Paulie Hodges, BEE Intern

## 2024-03-14 ENCOUNTER — OFFICE VISIT (OUTPATIENT)
Dept: BEHAVIORAL HEALTH | Facility: CLINIC | Age: 39
End: 2024-03-14
Payer: MEDICARE

## 2024-03-14 DIAGNOSIS — F33.1 MAJOR DEPRESSIVE DISORDER, RECURRENT EPISODE, MODERATE (HCC): ICD-10-CM

## 2024-03-14 DIAGNOSIS — F41.1 GENERALIZED ANXIETY DISORDER: ICD-10-CM

## 2024-03-14 PROCEDURE — 90834 PSYTX W PT 45 MINUTES: CPT | Performed by: MARRIAGE & FAMILY THERAPIST

## 2024-03-14 NOTE — PROGRESS NOTES
[facility]   Psychotherapy Progress Note    Therapy was provided on this date in coordination with the [facility] approved Clinical Supervisor under the direct supervision of Dr. Ken Cole who was on site during this visit.     Name: Yazan Hough III  MRN: [id number]  : 1985  Age: 39 y.o.  Date of assessment: 3/14/2024  PCP: Pcp Pt States None  Persons in attendance: Patient  Total session time: 50 minutes      Topics addressed in psychotherapy include: Yazan reports that things are going well, and he is back to taking lexapro after a short trial with duoloxetine which he was initially happy with b/c his sister has the same medical condition as him and recommended it for blocking nerve pain, but after a few weeks Yazan found it was making him irritable. He celebrated his 39th birthday by visiting his dad in the [facility] and that was enjoyable.  He would like to start looking for a job, ideally in a dispensary, but has gotten too comfortable sitting in his room at home. Intervention: We identified what the first steps to getting out of his comfort zone at home and finding a job might look like.  Yazan identifies that the first step would be to start practicing driving a little bit more to get his feet used to finding the pedals, and then he would feel more comfortable taking a drivers test to get his license reinstated due to his disability.     Objective Observations:   Participation:Active verbal participation, Attentive, Engaged, and Open to feedback   Grooming:Casual and Neat   Cognition:Alert and Fully Oriented   Eye Contact:Good   Mood:Euthymic   Affect:Flexible, Full range, Expansive, and Congruent with content   Thought Process:Logical and Goal-directed   Speech:Rate within normal limits and Volume within normal limits    Current Risk:   Suicide: low   Homicide: low   Self-Harm: low     Care Plan Updated: Yes    Does patient express agreement with the above  plan? Yes     Diagnosis:  1. Major depressive disorder, recurrent episode, moderate (HCC)    2. Generalized anxiety disorder        Referral appointment(s) scheduled? Yes       BEE Coffman Intern

## 2024-03-26 ENCOUNTER — OFFICE VISIT (OUTPATIENT)
Dept: NEUROLOGY | Facility: MEDICAL CENTER | Age: 39
End: 2024-03-26
Attending: STUDENT IN AN ORGANIZED HEALTH CARE EDUCATION/TRAINING PROGRAM
Payer: MEDICARE

## 2024-03-26 VITALS
HEART RATE: 60 BPM | WEIGHT: 187.17 LBS | SYSTOLIC BLOOD PRESSURE: 98 MMHG | BODY MASS INDEX: 27.72 KG/M2 | DIASTOLIC BLOOD PRESSURE: 60 MMHG | HEIGHT: 69 IN | TEMPERATURE: 98.7 F | OXYGEN SATURATION: 98 %

## 2024-03-26 DIAGNOSIS — G11.4 HEREDITARY SPASTIC PARAPARESIS (HCC): ICD-10-CM

## 2024-03-26 PROCEDURE — 99211 OFF/OP EST MAY X REQ PHY/QHP: CPT | Performed by: INTERNAL MEDICINE

## 2024-03-26 RX ORDER — ESCITALOPRAM OXALATE 20 MG/1
20 TABLET ORAL DAILY
COMMUNITY
End: 2024-03-28 | Stop reason: SDUPTHER

## 2024-03-26 RX ORDER — METHOCARBAMOL 500 MG/1
500 TABLET, FILM COATED ORAL 3 TIMES DAILY
Qty: 270 TABLET | Refills: 1 | Status: SHIPPED | OUTPATIENT
Start: 2024-03-26 | End: 2024-09-22

## 2024-03-26 ASSESSMENT — FIBROSIS 4 INDEX: FIB4 SCORE: 0.54

## 2024-03-26 NOTE — PROGRESS NOTES
[facility]  [address] [place] [address], [address]. [place] [address]  Phone: [phone] Patient Name: Yazan Hough III  : 1985  MRN: [id number]     ASSESSMENT / PLAN       Yazan Hough III is a 39 y.o. LHD male presenting for spasticity.    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     Exam notable for generalized spasticity, worse in legs. No signs of neuropathy. He has pain in legs when walking, with spasticity in knee flexion and ankle inversion. He is open to trying botox to relax the muscles for better posture. Discussed referral for baclofen pump in future.     - start approval for botox  - consider PT once starting botox injections      INFORMED CONSENT   The risks and benefits of the procedure were explained to the patient, and all questions were answered. Adverse effects from toxin injection include but are not limited to: excessive weakness, infection, breathing and/or swallowing difficulty.  Common adverse effects from the injection itself are pain and bruising. The patient demonstrated good understanding of risks and benefits and consents to botulinum toxin injection.     Planned injections:   RIGHT LEFT    LEG      Biceps femoris 25U x2 25U x2    Semimembranosus 25U 25U    Gastroc medial 25U x2 25U x2    Gastroc lateral 25U x2 25U x2    Soleus 25U x2 25U x2    Tibialis posterior 25U x2 25U x2      600 U of of botulinum were diluted in 12 ml of saline.   Total units used: 550   Total units wasted: 50       - Return for botox with ultrasound.    John Tobar DO  Neurology, Movement Disorders Specialist    BILLING DOCUMENTATION:   I spent 60 minutes reviewing the medical record, interviewing and examining the patient, discussing diagnosis and treatment, and coordinating care.          HISTORY OF PRESENT ILLNESS     "Yazan Hough III is a 39 y.o. LHD male presenting for spasticity.    Initial HPI 03/26/24  Last visit with Dr. Luther 11/2023    Older sister diagnosed with hereditary spastic paraplegia 15, autosomal recessive mutation [id number]. Onset approx 29yo. Diagnosed at [facility]. Now in her 40s and in wheelchair. Neuropathy in legs and now starting in the arms. Followed at [facility].   Mother: had genetic test at [facility] that was positive.   Biological father: didn't get test  Youngest brother: walking starting to change his gait. At the gym, fell backwards. Now 34yo.   No other relatives aware of with similar diagnosis.       Childhood without issues: played football, wrestling.   Onset approx 2017/2018. Was working with [employer] but legs started giving him issues. Started to hurt, trouble walking.   Now is having some pain in knee caps, legs. Worsened when walking. No pain/burning or numbness in feet. Not working with PT now.     - Baclofen 20mg TID. Now taking it intermittently. Had tolerated it well, but felt like sfx after starting escitalopram. Baclofen was limited in benefit per his report today.   - methocarbamol 1000mg QID: not taking it now. Was helpful in pain in legs.           MRI Brain with/without 3/2024  There subtle increased T2 signal intensity in the bilateral cerebral white matter. This can be seen in the patients with very minimal periventricular leukomalacia which can be secondary to the peripartum anoxia. Otherwise unremarkable pre and postcontrast MR examination of the brain.    Per my review, he does appear to have thinning anterior corpus callosum and \"ears of the lynx sign\" on ventricles          MRI C-spine 9/2023:  1.  Degenerative disease in the cervical spine as described above.  2.  There is severe left and moderate right C6 neural foraminal stenosis.  3.  Moderate to severe bilateral C4 neural foraminal stenosis.    MRI T-spine 9/2023:  MRI of the thoracic spine without and " with contrast within normal limits except for minimal disc degeneration..         1/9/2024    10:00 AM 4/21/2023     8:00 AM   PHQ-9 Screening   Little interest or pleasure in doing things 0 - not at all 0 - not at all   Feeling down, depressed, or hopeless 0 - not at all 0 - not at all   Trouble falling or staying asleep, or sleeping too much  3 - nearly every day   Feeling tired or having little energy  2 - more than half the days   Poor appetite or overeating  3 - nearly every day   Feeling bad about yourself - or that you are a failure or have let yourself or your family down  0 - not at all   Trouble concentrating on things, such as reading the newspaper or watching television  1 - several days   Moving or speaking so slowly that other people could have noticed. Or the opposite - being so fidgety or restless that you have been moving around a lot more than usual  2 - more than half the days   Thoughts that you would be better off dead, or of hurting yourself in some way  0 - not at all   PHQ-2 Total Score 0 0       Interpretation of PHQ-9 Total Score   Score Severity   1-4 No Depression   5-9 Mild Depression   10-14 Moderate Depression   15-19 Moderately Severe Depression   20-27 Severe Depression     No past medical history on file.    No past surgical history on file.    No family history on file.    Social History     Socioeconomic History    Marital status: Single     Spouse name: Not on file    Number of children: Not on file    Years of education: Not on file    Highest education level: Not on file   Occupational History    Not on file   Tobacco Use    Smoking status: Some Days     Types: Cigarettes    Smokeless tobacco: Never   Vaping Use    Vaping Use: Not on file   Substance and Sexual Activity    Alcohol use: Yes     Comment: occ    Drug use: Yes     Types: Inhaled, Marijuana     Comment: marijuana    Sexual activity: Not on file   Other Topics Concern    Not on file   Social History Narrative    **  "Merged History Encounter **          Social Determinants of Health     Financial Resource Strain: Not on file   Food Insecurity: Not on file   Transportation Needs: Not on file   Physical Activity: Not on file   Stress: Not on file   Social Connections: Not on file   Intimate Partner Violence: Not on file   Housing Stability: Not on file       Current Outpatient Medications   Medication    methocarbamol (ROBAXIN) 500 MG Tab    escitalopram (LEXAPRO) 20 MG tablet     No current facility-administered medications for this visit.       Allergies   Allergen Reactions    Codeine     Morphine        DATA / RESULTS      No results found for: \"25HYDROXY\", \"FOXXXKLQ77\", \"TSHULTRASEN\", \"SPIFINTERP\", \"GPT221\", \"TOMAS\", \"HBA1C\", \"LDL\"       OBJECTIVE      Vitals:    03/26/24 0724   BP: 98/60   BP Location: Left arm   Patient Position: Sitting   BP Cuff Size: Adult long   Pulse: 60   Temp: 37.1 °C (98.7 °F)   TempSrc: Temporal   SpO2: 98%   Weight: 84.9 kg (187 lb 2.7 oz)   Height: 1.753 m (5' 9\")     Physical Exam     General: NAD, appears stated age.      Mental status: Speech clear and fluent without tremor. Prolonged vowel sounds, ?scanning/spastic. Fund of knowledge is good.      Cranial Nerves:  CN2: PERRL. Visual fields are full to finger confrontation.   CN3/4/6: EOMI. There is no nystagmus. Saccades are normal.   CN5: V1-V3 intact to light touch    CN7: Symmetric face.   CN8: Hearing grossly intact.   CN9/10/12: Soft palate and uvula rise symmetrically. Tongue midline.   CN11: Shoulder shrug intact bilaterally.     Strength  Right Left   Shoulder Abduction  5 5   Elbow Flexion 5 5   Elbow Extension  5 5   Wrist Extension  5 5   Finger Extension  5 5   Hip Flexion  5 5   Knee Extension 5 5   Ankle Dorsiflexion  5 5     Spasticity:   Trace in bilateral arms  Moderate knee flexion, ankle inversion, ankle plantarflexion    Deep Tendon Reflexes: 2+ and symmetric in biceps, brachioradialis, 3+ patella and 2+ ankles. Plantar " reflexes in flexion.     Sensory: normal to sharp, temperature.    Quantitative tuning fork Right Left   Hands 8 8   Feet 7 7       Cerebellar: No dysmetria with FTN or heel-to-shin.    Gait:   In wheelchair and uses cane normally  When standing, hip flexed forward but able to straighten. Knee flexed, ankle inverted. Spastic gait, but loses balance quickly.       PROCEDURE     N/A

## 2024-03-28 ENCOUNTER — OFFICE VISIT (OUTPATIENT)
Dept: BEHAVIORAL HEALTH | Facility: CLINIC | Age: 39
End: 2024-03-28
Payer: MEDICARE

## 2024-03-28 DIAGNOSIS — F41.1 GENERALIZED ANXIETY DISORDER: ICD-10-CM

## 2024-03-28 DIAGNOSIS — F51.01 PRIMARY INSOMNIA: ICD-10-CM

## 2024-03-28 DIAGNOSIS — F33.1 MAJOR DEPRESSIVE DISORDER, RECURRENT EPISODE, MODERATE (HCC): ICD-10-CM

## 2024-03-28 PROCEDURE — 99214 OFFICE O/P EST MOD 30 MIN: CPT | Performed by: PSYCHIATRY & NEUROLOGY

## 2024-03-28 RX ORDER — ESCITALOPRAM OXALATE 20 MG/1
20 TABLET ORAL NIGHTLY
Qty: 90 TABLET | Refills: 0 | Status: SHIPPED | OUTPATIENT
Start: 2024-03-28 | End: 2024-06-26

## 2024-03-28 NOTE — PROGRESS NOTES
[facility]   Follow Up Assessment     This provider informed the patient their medical records are totally confidential except for the use by other providers involved in their care, or if the patient signs a release, or to report instances of child or elder abuse, or if it is determined they are an immediate risk to harm themselves or others.    Name: Yazan Hough III  MRN: [id number]  : 1985  Age: 39 y.o.  Date of assessment: 3/28/2024  PCP: Verena Mooney M.D.      Subjective:  Chart reviewed prior to seeing him and his mother in my office.  He was last seen on .  He indicates that he briefly tried Cymbalta but does prefer Lexapro 20 mg at bedtime.  Because of the likelihood of drowsiness he should take Lexapro 1 hour before bedtime.  Lexapro does have some antianxiety benefits.    Objective:  His mother pushes his wheelchair.  He is alert, oriented, and cooperative.  Relatedness is good.  Grooming is good.  Speech is normal rate.  Anxious.  Memory is fair.  Insight and judgment are fair.  No indication of psychotic thinking.    Current Risk:       Suicidal: Not suicidal       Homicidal: Not homicidal       Self-Harm: No plan to harm self       Relapse: (Low/Moderate/High): Moderate       Crisis Safety Plan Reviewed: Discussed with patient    Diagnosis:   Major depressive disorder, recurrent  Generalized anxiety disorder  Insomnia    Treatment Plan:  The current treatment plan consists of psychiatric sessions every 6 months designed to evaluate his depression, anxiety, and insomnia.    Duration will be for a minimum of 12 months and will be reviewed at each visit.    Goals: Remission of depression with control of anxiety and improvement in sleep in order to prevent relapse due to the chronic nature of his behavioral health problems and mental illness.  Defer on trazodone.    Ken Cole M.D.      This note was created using voice recognition software (Dragon). The  accuracy of the dictation is limited by the abilities of the software. I have reviewed the note prior to signing, however some errors in grammar and context are still possible. If you have any questions related to this note please do not hesitate to contact our office.

## 2024-04-18 ENCOUNTER — APPOINTMENT (OUTPATIENT)
Dept: BEHAVIORAL HEALTH | Facility: CLINIC | Age: 39
End: 2024-04-18
Payer: MEDICARE

## 2024-04-18 DIAGNOSIS — F33.1 MAJOR DEPRESSIVE DISORDER, RECURRENT EPISODE, MODERATE (HCC): ICD-10-CM

## 2024-04-18 DIAGNOSIS — F41.1 GENERALIZED ANXIETY DISORDER: ICD-10-CM

## 2024-04-18 PROCEDURE — 90834 PSYTX W PT 45 MINUTES: CPT | Performed by: MARRIAGE & FAMILY THERAPIST

## 2024-04-19 NOTE — PROGRESS NOTES
[facility]   Psychotherapy Progress Note    Therapy was provided on this date in coordination with the [facility] approved Clinical Supervisor under the direct supervision of Dr. Ken Cole who was on site during this visit.     Name: Yazan Hough III  MRN: [id number]  : 1985  Age: 39 y.o.  Date of assessment: 2024  PCP: Verena Mooney M.D.  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  We discussed what he will do to celebrate if he accomplishes/ passes the test.  He is motivated to get a job to start earning better income if he gets his license, as well as start going to the gym again.  Once he has worked on himself, then he will be looking forward to dating again. Supportive psychotherapy provided.     Objective Observations:   Participation:Active verbal participation, Attentive, Engaged, and Open to feedback   Grooming:Casual and Neat   Cognition:Alert and Fully Oriented   Eye Contact:Good   Mood:Euthymic   Affect:Flexible, Full range, Expansive, and Congruent with content   Thought Process:Logical and Goal-directed   Speech:Rate within normal limits and Volume within normal limits    Current Risk:   Suicide: low   Homicide: low   Self-Harm: low     Care Plan Updated: Yes    Does patient express agreement with the above plan? Yes     Diagnosis:  1. Major depressive disorder, recurrent episode, moderate (HCC)    2. Generalized anxiety disorder        Referral appointment(s) scheduled? Yes      BEE Coffman Intern

## 2024-05-16 ENCOUNTER — OFFICE VISIT (OUTPATIENT)
Dept: BEHAVIORAL HEALTH | Facility: CLINIC | Age: 39
End: 2024-05-16
Payer: MEDICARE

## 2024-05-16 DIAGNOSIS — F33.1 MAJOR DEPRESSIVE DISORDER, RECURRENT EPISODE, MODERATE (HCC): ICD-10-CM

## 2024-05-16 DIAGNOSIS — F41.1 GENERALIZED ANXIETY DISORDER: ICD-10-CM

## 2024-05-16 PROCEDURE — 90834 PSYTX W PT 45 MINUTES: CPT | Performed by: MARRIAGE & FAMILY THERAPIST

## 2024-05-16 NOTE — PROGRESS NOTES
[facility]   Psychotherapy Progress Note    Therapy was provided on this date in coordination with the [facility] approved Clinical Supervisor under the direct supervision of Dr. Ken Cole who was on site during this visit.     Name: Yazan Hough III  MRN: [id number]  : 1985  Age: 39 y.o.  Date of assessment: 2024  PCP: Verena Mooney M.D.  Persons in attendance: Patient  Total session time: 50 minutes      Topics addressed in psychotherapy include: Yazan reports his parents are in [place] and his Milla has been staying at the house with him. His drivers license test didn't happen because the protocol changed to appointment only/ no walk-ins, so he has to wait until  to take the drviers test.  We discussed what he can be doing in the meanwhile to set himself up for personal success once he gets his license. Yazan wants to be working out at home, and it is difficult for him the rest of the time when his mind is running with ruminating thoughts while he sits in his room. We reviewed being mode vs doing mode to help Yazan get into Being mode to experience less stress and more mindful relaxation.     Objective Observations:   Participation:Active verbal participation, Attentive, Engaged, and Open to feedback   Grooming:Casual and Neat   Cognition:Alert and Fully Oriented   Eye Contact:Good   Mood:Euthymic   Affect:Flexible, Full range, Expansive, and Congruent with content   Thought Process:Logical and Goal-directed   Speech:Rate within normal limits and Volume within normal limits    Current Risk:   Suicide: low   Homicide: low   Self-Harm: low     Care Plan Updated: Yes    Does patient express agreement with the above plan? Yes     Diagnosis:  1. Major depressive disorder, recurrent episode, moderate (HCC)    2. Generalized anxiety disorder        Referral appointment(s) scheduled? Yes       BEE Coffman Intern

## 2024-06-13 ENCOUNTER — OFFICE VISIT (OUTPATIENT)
Dept: BEHAVIORAL HEALTH | Facility: CLINIC | Age: 39
End: 2024-06-13
Payer: MEDICARE

## 2024-06-13 DIAGNOSIS — F43.20 ADJUSTMENT DISORDER, UNSPECIFIED TYPE: ICD-10-CM

## 2024-06-13 DIAGNOSIS — F41.1 GENERALIZED ANXIETY DISORDER: ICD-10-CM

## 2024-06-13 PROCEDURE — 90834 PSYTX W PT 45 MINUTES: CPT | Performed by: MARRIAGE & FAMILY THERAPIST

## 2024-06-14 NOTE — PROGRESS NOTES
[facility]   Psychotherapy Progress Note    Therapy was provided on this date in coordination with the [facility] approved Clinical Supervisor under the direct supervision of Dr. Ken Cole who was on site during this visit.     Name: Yazan Hough III  MRN: [id number]  : 1985  Age: 39 y.o.  Date of assessment: 2024  PCP: Verena Mooney M.D.  Persons in attendance: Patient  Total session time: 50 minutes      Topics addressed in psychotherapy include: Yazan reports that he has safety and comfort as he lives on disability and spends his days at home, and he is wanting companionship and love to feel more fulfilled, and he doesn't feel with his disability and without a job that he would be desirable to a partner.  We used CBT tools to explore his ANTS that he is not worthy of love, and we reviewed Maslow's hierarchy of needs to help Yazan better understand the progression of how his needs are fulfilled and what that looks like.     Objective Observations:   Participation:Active verbal participation, Attentive, Engaged, and Open to feedback   Grooming:Casual and Neat   Cognition:Alert and Fully Oriented   Eye Contact:Good   Mood:Euthymic   Affect:Flexible, Full range, Expansive, and Congruent with content   Thought Process:Logical and Goal-directed   Speech:Rate within normal limits and Volume within normal limits    Current Risk:   Suicide: low   Homicide: low   Self-Harm: low     Care Plan Updated: Yes    Does patient express agreement with the above plan? Yes     Diagnosis:  1. Generalized anxiety disorder    2. Adjustment disorder, unspecified type        Referral appointment(s) scheduled? Yes      BEE Coffman Intern

## 2024-06-20 ENCOUNTER — TELEPHONE (OUTPATIENT)
Dept: NEUROLOGY | Facility: MEDICAL CENTER | Age: 39
End: 2024-06-20
Payer: MEDICARE

## 2024-06-20 NOTE — TELEPHONE ENCOUNTER
NEUROLOGY PATIENT PRE-VISIT PLANNING     Patient was NOT contacted to complete PVP.  Note: Patient will not be contacted if there is no indication to call.     Patient Appointment is scheduled as: Established Patient     Is visit type and length scheduled correctly? Yes    [employer] Patient is checked in Patient Demographics? Yes    3.   Is referral attached to visit? Yes    4. Were records received from referring provider? Yes    4. Patient was NOT contacted to have someone accompany them to visit.     5. Is this appointment scheduled as a Hospital Follow-Up?  No    6. Does the patient require any pre procedure or post procedure follow up? No    7. If any orders were placed at last visit or intended to be done for this visit do we have Results/Consult Notes? No  Labs - Labs were not ordered at last office visit.  Imaging - Imaging was not ordered at last office visit.  Referrals - No referrals were ordered at last office visit.  Note: If patient appointment is for lab or imaging review and patient did not complete the studies, check with provider if OK to reschedule patient until completed.    8. If patient appointment is for Botox - is order pended for provider? No (with explanation) per provider not to pend orders    9. Was Plan Assessment from last Neurology Office Visit Reviewed?  Yes

## 2024-06-28 ENCOUNTER — OFFICE VISIT (OUTPATIENT)
Dept: NEUROLOGY | Facility: MEDICAL CENTER | Age: 39
End: 2024-06-28
Attending: INTERNAL MEDICINE
Payer: MEDICARE

## 2024-06-28 VITALS
DIASTOLIC BLOOD PRESSURE: 70 MMHG | HEART RATE: 74 BPM | HEIGHT: 69 IN | SYSTOLIC BLOOD PRESSURE: 122 MMHG | TEMPERATURE: 97.5 F | OXYGEN SATURATION: 97 % | BODY MASS INDEX: 28.41 KG/M2 | RESPIRATION RATE: 20 BRPM | WEIGHT: 191.8 LBS

## 2024-06-28 DIAGNOSIS — G11.4 HEREDITARY SPASTIC PARAPARESIS (HCC): Primary | ICD-10-CM

## 2024-06-28 PROCEDURE — 64644 CHEMODENERV 1 EXTREM 5/> MUS: CPT | Performed by: INTERNAL MEDICINE

## 2024-06-28 PROCEDURE — 76942 ECHO GUIDE FOR BIOPSY: CPT | Performed by: INTERNAL MEDICINE

## 2024-06-28 PROCEDURE — 64645 CHEMODENERV 1 EXTREM 5/> EA: CPT | Performed by: INTERNAL MEDICINE

## 2024-06-28 PROCEDURE — 700111 HCHG RX REV CODE 636 W/ 250 OVERRIDE (IP): Mod: JZ,JG

## 2024-06-28 PROCEDURE — 700101 HCHG RX REV CODE 250

## 2024-06-28 RX ORDER — ESCITALOPRAM OXALATE 20 MG/1
20 TABLET ORAL DAILY
COMMUNITY

## 2024-06-28 RX ADMIN — SODIUM CHLORIDE 200 UNITS: 9 INJECTION INTRAMUSCULAR; INTRAVENOUS; SUBCUTANEOUS at 15:55

## 2024-06-28 RX ADMIN — SODIUM CHLORIDE 200 UNITS: 9 INJECTION INTRAMUSCULAR; INTRAVENOUS; SUBCUTANEOUS at 15:56

## 2024-06-28 ASSESSMENT — FIBROSIS 4 INDEX: FIB4 SCORE: 0.54

## 2024-06-28 NOTE — PROGRESS NOTES
[facility]  [address] [place] [address], [address]. [place] [address]  Phone: [phone]    John Tobar DO  Neurology, Movement Disorders Patient Name: Yazan Hough III  : 1985  MRN: [id number]     ASSESSMENT / PLAN       Yazan Huogh III is a 39 y.o. LHD male presenting for spasticity.    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 MRI Brain findings also consistent with HSP 15.     Exam notable for generalized spasticity, worse in legs. No signs of neuropathy. He has pain in legs when walking, with spasticity in knee flexion and ankle inversion. He is open to trying botox to relax the muscles for better posture. Discussed referral for baclofen pump in future.     - Repeat Botox 400U. May increase to 600U depending on results after first injection today.   - consider PT once starting botox injections    Orders Placed This Encounter    onabotulinum toxin A (Botox) injection 200 Units    onabotulinum toxin A (Botox) injection 200 Units    escitalopram (LEXAPRO) 20 MG tablet     Return in about 3 months (around 2024) for Botox with ultrasound.    BILLING DOCUMENTATION:   04241, [id number], [id number]    HISTORY OF PRESENT ILLNESS      Yazan Hough III is a 39 y.o. LHD male presenting for spasticity.    Initial HPI 24  Last visit with Dr. Luther 2023    Older sister diagnosed with hereditary spastic paraplegia 15, autosomal recessive mutation [id number]. Onset approx 31yo. Diagnosed at [facility]. Now in her 40s and in wheelchair. Neuropathy in legs and now starting in the arms. Followed at [facility].   Mother: had genetic test at [facility] that was positive.   Biological father: didn't get test  Youngest brother: walking starting to change his gait. At the gym, fell backwards. Now 34yo.   No other relatives aware of with  similar diagnosis.     Childhood without issues: played football, wrestling.   Onset approx 2017/2018. Was working with [employer] but legs started giving him issues. Started to hurt, trouble walking.   Now is having some pain in knee caps, legs. Worsened when walking. No pain/burning or numbness in feet. Not working with PT now.     - Baclofen 20mg TID. Now taking it intermittently. Had tolerated it well, but felt like sfx after starting escitalopram. Baclofen was limited in benefit per his report today.   - methocarbamol 1000mg QID: not taking it now. Was helpful in pain in legs.     Interval History  3/2024: first visit with me. No med changes  06/28/24: botox #1.     KATHY  Notes bladder spasticity, pending follow up with urology          1/9/2024    10:00 AM 4/21/2023     8:00 AM   PHQ-9 Screening   Little interest or pleasure in doing things 0 - not at all 0 - not at all   Feeling down, depressed, or hopeless 0 - not at all 0 - not at all   Trouble falling or staying asleep, or sleeping too much  3 - nearly every day   Feeling tired or having little energy  2 - more than half the days   Poor appetite or overeating  3 - nearly every day   Feeling bad about yourself - or that you are a failure or have let yourself or your family down  0 - not at all   Trouble concentrating on things, such as reading the newspaper or watching television  1 - several days   Moving or speaking so slowly that other people could have noticed. Or the opposite - being so fidgety or restless that you have been moving around a lot more than usual  2 - more than half the days   Thoughts that you would be better off dead, or of hurting yourself in some way  0 - not at all   PHQ-2 Total Score 0 0     No past medical history on file.    No past surgical history on file.    No family history on file.    Social History     Socioeconomic History    Marital status: Single     Spouse name: Not on file    Number of children: Not on file    Years  "of education: Not on file    Highest education level: Not on file   Occupational History    Not on file   Tobacco Use    Smoking status: Former     Types: Cigarettes    Smokeless tobacco: Never   Vaping Use    Vaping status: Never Used   Substance and Sexual Activity    Alcohol use: Yes     Comment: occ    Drug use: Yes     Types: Inhaled, Marijuana     Comment: marijuana    Sexual activity: Not on file   Other Topics Concern    Not on file   Social History Narrative    ** Merged History Encounter **          Social Determinants of Health     Financial Resource Strain: Not on file   Food Insecurity: Not on file   Transportation Needs: Not on file   Physical Activity: Not on file   Stress: Not on file   Social Connections: Not on file   Intimate Partner Violence: Not on file   Housing Stability: Not on file       Current Outpatient Medications   Medication    escitalopram (LEXAPRO) 20 MG tablet    methocarbamol (ROBAXIN) 500 MG Tab     No current facility-administered medications for this visit.       Allergies   Allergen Reactions    Codeine     Morphine        DATA / RESULTS      MRI Brain with/without 3/2024  There subtle increased T2 signal intensity in the bilateral cerebral white matter. This can be seen in the patients with very minimal periventricular leukomalacia which can be secondary to the peripartum anoxia. Otherwise unremarkable pre and postcontrast MR examination of the brain.    Per my review, he does appear to have thinning anterior corpus callosum and \"ears of the lynx sign\" on ventricles          MRI C-spine 9/2023:  1.  Degenerative disease in the cervical spine as described above.  2.  There is severe left and moderate right C6 neural foraminal stenosis.  3.  Moderate to severe bilateral C4 neural foraminal stenosis.    MRI T-spine 9/2023:  MRI of the thoracic spine without and with contrast within normal limits except for minimal disc degeneration.    No results found for: \"25HYDROXY\", " "\"BZUIDCVL20\", \"TSHULTRASEN\", \"SPIFINTERP\", \"MAX078\", \"TOMAS\", \"HBA1C\", \"LDL\"     OBJECTIVE      Vitals:    06/28/24 1511   BP: 122/70   BP Location: Left arm   Patient Position: Sitting   BP Cuff Size: Adult   Pulse: 74   Resp: 20   Temp: 36.4 °C (97.5 °F)   TempSrc: Temporal   SpO2: 97%   Weight: 87 kg (191 lb 12.8 oz)   Height: 1.753 m (5' 9\")     Physical Exam   Spasticity:   Trace in bilateral arms  Moderate knee flexion, ankle inversion, ankle plantarflexion    PROCEDURE     Botulinum toxin injections  Dx: spasticity  Procedure date: 06/28/24    Pre-procedure time out was performed.     PROCEDURE:  Usual sterile procedure was observed with sterile prep with chlorhexidine. Ultrasound was used for needle guidance for the injections in the following muscles. Ultrasound indicated to allow greater accuracy and to minimize potential damage to nerves, arteries, veins, and injection of unintended muscles. A negative aspiration of blood was obtained, and the following was injected into each muscle.    Injection sites were cleaned with alcohol and band aid was applied afterwards.  The patient tolerated the procedure well.  There were no complications.  The images were uploaded for permanent storage    Repeat injections every 3 months    400 U of of botulinum were diluted in 8 ml of saline.   27g needles used, ultrasound guidance.     RIGHT LEFT    LEG      Biceps femoris 25U 25U 50   Semimembranosus 25U 25U 50   Gastroc medial 25U 25U 50   Gastroc lateral 25U 25U 50   Soleus 25U x2 25U x2 100   Tibialis posterior 25U x2 25U x2 100   Total units used:  400  Total units wasted: 0  "

## 2024-07-01 RX ORDER — ESCITALOPRAM OXALATE 20 MG/1
20 TABLET ORAL EVERY EVENING
Qty: 90 TABLET | Refills: 0 | Status: SHIPPED | OUTPATIENT
Start: 2024-07-01

## 2024-07-12 ENCOUNTER — OFFICE VISIT (OUTPATIENT)
Dept: BEHAVIORAL HEALTH | Facility: CLINIC | Age: 39
End: 2024-07-12
Payer: MEDICARE

## 2024-07-12 DIAGNOSIS — F33.1 MAJOR DEPRESSIVE DISORDER, RECURRENT EPISODE, MODERATE (HCC): ICD-10-CM

## 2024-07-12 PROCEDURE — 90834 PSYTX W PT 45 MINUTES: CPT | Performed by: MARRIAGE & FAMILY THERAPIST

## 2024-08-06 ENCOUNTER — TELEPHONE (OUTPATIENT)
Dept: NEUROLOGY | Facility: MEDICAL CENTER | Age: 39
End: 2024-08-06
Payer: MEDICARE

## 2024-08-06 NOTE — TELEPHONE ENCOUNTER
Lindsay from medical marijuana called and left message requesting a call back.  I called back, I spoke with Sandra, she just wanted to confirm that Dr. Tobar had filled out the medical marijuana card form. She is aware Dr. Tobar did fill out and signed this form.

## 2024-08-14 ENCOUNTER — PATIENT MESSAGE (OUTPATIENT)
Dept: NEUROLOGY | Facility: MEDICAL CENTER | Age: 39
End: 2024-08-14
Payer: MEDICARE

## 2024-08-14 DIAGNOSIS — M79.2 NEUROPATHIC PAIN: ICD-10-CM

## 2024-08-14 RX ORDER — GABAPENTIN 300 MG/1
300 CAPSULE ORAL 3 TIMES DAILY PRN
Qty: 270 CAPSULE | Refills: 3 | Status: SHIPPED | OUTPATIENT
Start: 2024-08-14

## 2024-08-15 ENCOUNTER — OFFICE VISIT (OUTPATIENT)
Dept: BEHAVIORAL HEALTH | Facility: CLINIC | Age: 39
End: 2024-08-15
Payer: MEDICARE

## 2024-08-15 DIAGNOSIS — F33.1 MAJOR DEPRESSIVE DISORDER, RECURRENT EPISODE, MODERATE (HCC): ICD-10-CM

## 2024-08-15 PROCEDURE — 90834 PSYTX W PT 45 MINUTES: CPT | Performed by: MARRIAGE & FAMILY THERAPIST

## 2024-08-16 NOTE — PROGRESS NOTES
"[facility]   Psychotherapy Progress Note        Name: Yazan Hough III  MRN: [id number]  : 1985  Age: 39 y.o.  Date of assessment: 8/15/2024  PCP: Verena Mooney M.D.  Persons in attendance: Patient  Total session time: 50 minutes      Topics addressed in psychotherapy include: Yazan reports that he has continued to try to focus on having a healthy and grateful outlook on things, and several times he refers to his desire to be having a good \"output\" of energy in positive directions.  We explored in detail what this means and how is is going to manifest or achieve these outcomes. Reji is hoping to take and pass the drivers test next month for his license, he wants to start exercising to get his body back in shape, and once he gets his drivers license start going to the gym, continue to find ways to contribute around the house (he recently started helping by doing dishes), and potentially start looking for a girlfriend. Motivational interviewing conducted to promote optimism and action toward Yazan' defined goals.     Objective Observations:   Participation:Active verbal participation, Attentive, Engaged, and Open to feedback   Grooming:Casual and Neat   Cognition:Alert and Fully Oriented   Eye Contact:Good   Mood:Euthymic   Affect:Flexible, Full range, Expansive, and Congruent with content   Thought Process:Logical and Goal-directed   Speech:Rate within normal limits and Volume within normal limits    Current Risk:   Suicide: low   Homicide: low   Self-Harm: low       Care Plan Updated: Yes    Does patient express agreement with the above plan? Yes     Diagnosis:  1. Major depressive disorder, recurrent episode, moderate (HCC)        Referral appointment(s) scheduled? Yes      NEGRITA Coffman.    "

## 2024-09-12 ENCOUNTER — OFFICE VISIT (OUTPATIENT)
Dept: BEHAVIORAL HEALTH | Facility: CLINIC | Age: 39
End: 2024-09-12
Payer: MEDICARE

## 2024-09-12 DIAGNOSIS — F41.1 GENERALIZED ANXIETY DISORDER: ICD-10-CM

## 2024-09-12 DIAGNOSIS — F33.1 MAJOR DEPRESSIVE DISORDER, RECURRENT EPISODE, MODERATE (HCC): ICD-10-CM

## 2024-09-12 PROCEDURE — 90834 PSYTX W PT 45 MINUTES: CPT | Performed by: MARRIAGE & FAMILY THERAPIST

## 2024-09-16 NOTE — PROGRESS NOTES
[facility]   Psychotherapy Progress Note        Name: Yazan Hough III  MRN: [id number]  : 1985  Age: 39 y.o.  Date of assessment: 2024  PCP: Verena Mooney M.D.  Persons in attendance: Patient  Total session time: 50 minutes      Topics addressed in psychotherapy include: Yazan reports he started going to [place] again with his brother and his mom and that is a fulfilling experience that he wants to start doing more often. He is excited to take his drivers license test in a few weeks after pushing it off for so many months, so that he can finally start moving forward with ideally a profession, a personal relationship, and going to a gym. Supportive psychotherapy provided.     Objective Observations:   Participation:Active verbal participation, Attentive, Engaged, and Open to feedback   Grooming:Casual and Neat   Cognition:Alert and Fully Oriented   Eye Contact:Good   Mood:Euthymic   Affect:Flexible, Expansive, and Congruent with content   Thought Process:Logical and Goal-directed   Speech:Rate within normal limits and Volume within normal limits    Current Risk:   Suicide: low   Homicide: low   Self-Harm: low     Care Plan Updated: Yes    Does patient express agreement with the above plan? Yes     Diagnosis:  1. Major depressive disorder, recurrent episode, moderate (HCC)    2. Generalized anxiety disorder        Referral appointment(s) scheduled? No       GLORIA Coffman

## 2024-09-19 ENCOUNTER — OFFICE VISIT (OUTPATIENT)
Dept: BEHAVIORAL HEALTH | Facility: CLINIC | Age: 39
End: 2024-09-19
Payer: MEDICARE

## 2024-09-19 ENCOUNTER — TELEPHONE (OUTPATIENT)
Dept: NEUROLOGY | Facility: MEDICAL CENTER | Age: 39
End: 2024-09-19
Payer: MEDICARE

## 2024-09-19 DIAGNOSIS — F41.1 GENERALIZED ANXIETY DISORDER: ICD-10-CM

## 2024-09-19 DIAGNOSIS — F51.01 PRIMARY INSOMNIA: ICD-10-CM

## 2024-09-19 DIAGNOSIS — F33.1 MAJOR DEPRESSIVE DISORDER, RECURRENT EPISODE, MODERATE (HCC): ICD-10-CM

## 2024-09-19 PROCEDURE — 99214 OFFICE O/P EST MOD 30 MIN: CPT | Performed by: PSYCHIATRY & NEUROLOGY

## 2024-09-19 RX ORDER — DESIPRAMINE HYDROCHLORIDE 25 MG/1
25 TABLET ORAL DAILY
Qty: 30 TABLET | Refills: 0 | Status: SHIPPED | OUTPATIENT
Start: 2024-09-19 | End: 2024-10-19

## 2024-09-19 RX ORDER — ESCITALOPRAM OXALATE 20 MG/1
20 TABLET ORAL EVERY EVENING
Qty: 90 TABLET | Refills: 0 | Status: SHIPPED | OUTPATIENT
Start: 2024-09-19 | End: 2024-12-18

## 2024-09-19 NOTE — PROGRESS NOTES
[facility]   Follow Up Assessment     This provider informed the patient their medical records are totally confidential except for the use by other providers involved in their care, or if the patient signs a release, or to report instances of child or elder abuse, or if it is determined they are an immediate risk to harm themselves or others.    Name: Yazan Hough III  MRN: [id number]  : 1985  Age: 39 y.o.  Date of assessment: 2024  PCP: Verena Mooney M.D.      Subjective:  Chart reviewed prior to seeing him and his mother in my office.  He was seen most recently on .  Lexapro 20 mg at bedtime is helping his depression to some extent but he has difficulty motivating him self and is discouraged about his neurological problem.  He has been diagnosed as having hereditary spastic paraplegia and it appears to be progressing.  His sister has it also.  He tried Cymbalta and Prozac previously.  He is using marijuana on a daily basis and that could well be interfering with the effectiveness of any antidepressant.    Objective:  He is using a wheeled apparatus which he kneels upon with 1 leg and maneuvers quite well.  He is alert, oriented, and cooperative.  Relatedness is good.  Grooming is good.  Speech is normal rate.  Anxious.  Memory is fair.  Insight and judgment are fair.  No organized delusional system elicited.    Current Risk:       Suicidal: Not suicidal       Homicidal: Not homicidal       Self-Harm: No plan to harm self       Relapse: (Low/Moderate/High): Moderate       Crisis Safety Plan Reviewed: Discussed with patient    Diagnosis:   Major depressive disorder, recurrent  Generalized anxiety disorder  Insomnia  Hereditary spastic paraplegia    Treatment Plan:  The current treatment plan consists of quarterly psychiatric sessions designed to evaluate his depression, anxiety, and insomnia.    Duration will be for a minimum of 12 months and will be reviewed at each  visit.    Goals: Remission of depression with control of anxiety and improvement in sleep in order to prevent relapse due to the chronic nature of his behavioral health problems and mental illness.  Continue Lexapro 20 mg at bedtime.  Add desipramine 25 mg a.m.  Possible side effects discussed.    Ken Cole M.D.      This note was created using voice recognition software (Dragon). The accuracy of the dictation is limited by the abilities of the software. I have reviewed the note prior to signing, however some errors in grammar and context are still possible. If you have any questions related to this note please do not hesitate to contact our office.

## 2024-09-19 NOTE — TELEPHONE ENCOUNTER
Pt and his mom came in to the clinic to drop off DMV disable placard form, per pt the DMV form given to him in   and per DMV he needs a new one filled out. Pt was scheduled for a virtual visit on 24 at 10:20 am. Per pt's mom he will not continue with the Botox treatment because it didn't help much.  They are aware message will be send to Dr. Tobar to notify him of this, also DMV form will be given to Dr. Tobar, once form is filled out by provided I will be calling them to come  the form. They both verbalized understanding

## 2024-09-23 PROBLEM — M19.041 OSTEOARTHRITIS OF RIGHT HAND: Status: ACTIVE | Noted: 2024-09-23

## 2024-09-23 PROBLEM — M25.831 ULNAR IMPACTION SYNDROME, RIGHT: Status: ACTIVE | Noted: 2024-09-23

## 2024-09-24 ENCOUNTER — TELEMEDICINE (OUTPATIENT)
Dept: NEUROLOGY | Facility: MEDICAL CENTER | Age: 39
End: 2024-09-24
Attending: INTERNAL MEDICINE
Payer: MEDICARE

## 2024-09-24 VITALS — WEIGHT: 189 LBS | HEIGHT: 69 IN | BODY MASS INDEX: 27.99 KG/M2

## 2024-09-24 DIAGNOSIS — G11.4 HEREDITARY SPASTIC PARAPLEGIA (HCC): ICD-10-CM

## 2024-09-24 PROCEDURE — 99214 OFFICE O/P EST MOD 30 MIN: CPT | Mod: 95 | Performed by: INTERNAL MEDICINE

## 2024-09-24 PROCEDURE — G2211 COMPLEX E/M VISIT ADD ON: HCPCS | Performed by: INTERNAL MEDICINE

## 2024-09-24 NOTE — PROGRESS NOTES
John Tobar,   Neurology, Movement Disorders [facility]  [address], [address]. [place] [address]  Phone: [phone], Fax: [phone]     ASSESSMENT / PLAN   Yazan Hough III is a 39 y.o. LHD male presenting for spasticity.     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 MRI Brain findings also consistent with HSP 15.      Exam notable for generalized spasticity, worse in legs. No signs of neuropathy. He has pain in legs when walking, with spasticity in knee flexion and ankle inversion. He is open to continuing botox to relax the muscles for better posture. Discussed referral for baclofen pump in future.      - Plan for higher dose of Botox 600U but cost prohibitive. Will sort out billing issues first.   - DMV form completed      No orders of the defined types were placed in this encounter.    No follow-ups on file.    BILLING DOCUMENTATION:   Excluding time spent on procedures during visit, I spent 30 minutes reviewing the medical record, interviewing and examining the patient, discussing diagnosis and treatment, and coordinating care.    This evaluation was conducted via [employer] Teams using secure and encrypted videoconferencing technology. The patient was in their home in the [facility].    The patient's identity was confirmed and verbal consent was obtained for this virtual visit.            HISTORY OF PRESENT ILLNESS   Yazan Hough III is a 39 y.o. LHD male presenting for spasticity.     Initial HPI 03/26/24  Last visit with Dr. Luther 11/2023     Older sister diagnosed with hereditary spastic paraplegia 15, autosomal recessive mutation [id number]. Onset approx 29yo. Diagnosed at [facility]. Now in her 40s and in wheelchair. Neuropathy in legs and now starting in the arms. Followed at [facility].   Mother:  had genetic test at [facility] that was positive.   Biological father: didn't get test  Youngest brother: walking starting to change his gait. At the gym, fell backwards. Now 34yo.   No other relatives aware of with similar diagnosis.      Childhood without issues: played football, wrestling.   Onset approx 2017/2018. Was working with [employer] but legs started giving him issues. Started to hurt, trouble walking.   Now is having some pain in knee caps, legs. Worsened when walking. No pain/burning or numbness in feet. Not working with PT now.     Interval History  3/2024: first visit with me. No med changes  06/28/24: botox #1. Able to straighten out legs more, but ankles seemed unchanged. OOP cost $600 and can't continue  09/24/24: no med changes     Current regimen  Baclofen 20mg TID. Now taking it intermittently. Had tolerated it well, but felt like sfx after starting escitalopram. Baclofen was limited in benefit per his report today.   methocarbamol 1000mg QID: not taking it now. Was helpful in pain in legs.   Botox last in 6/28/2024     ROS  MSK: more consistently taking baclofen now, some benefit.   Last drove 5 months ago due to DUI. Only having trouble walking, but no issues with controlling ankles and foot. Mother does not have concern with him driving when they were practicing in safe area.  : Notes bladder spasticity, pending follow up with urology. Considering bladder stimulation.   Mood: seeing Dr. Cole. Added desipramine recently.       No past medical history on file.  No past surgical history on file.  No family history on file.  Social History     Socioeconomic History    Marital status: Single     Spouse name: Not on file    Number of children: Not on file    Years of education: Not on file    Highest education level: Not on file   Occupational History    Not on file   Tobacco Use    Smoking status: Former     Types: Cigarettes    Smokeless tobacco: Never   Vaping Use    Vaping status: Never Used  "  Substance and Sexual Activity    Alcohol use: Yes     Comment: occ    Drug use: Yes     Types: Inhaled, Marijuana     Comment: marijuana    Sexual activity: Not on file   Other Topics Concern    Not on file   Social History Narrative    ** Merged History Encounter **          Social Determinants of Health     Financial Resource Strain: Not on file   Food Insecurity: Not on file   Transportation Needs: Not on file   Physical Activity: Not on file   Stress: Not on file   Social Connections: Not on file   Intimate Partner Violence: Not on file   Housing Stability: Not on file     Current Outpatient Medications   Medication    escitalopram (LEXAPRO) 20 MG tablet    desipramine (NORPRAMIN) 25 MG Tab    gabapentin (NEURONTIN) 300 MG Cap     No current facility-administered medications for this visit.     Allergies   Allergen Reactions    Codeine     Codeine Nausea    Hydrocodone     Morphine              DATA / RESULTS     MRI Brain with/without 3/2024  There subtle increased T2 signal intensity in the bilateral cerebral white matter. This can be seen in the patients with very minimal periventricular leukomalacia which can be secondary to the peripartum anoxia. Otherwise unremarkable pre and postcontrast MR examination of the brain.     Per my review, he does appear to have thinning anterior corpus callosum and \"ears of the lynx sign\" on ventricles            MRI C-spine 9/2023:  1.  Degenerative disease in the cervical spine as described above.  2.  There is severe left and moderate right C6 neural foraminal stenosis.  3.  Moderate to severe bilateral C4 neural foraminal stenosis.     MRI T-spine 9/2023:  MRI of the thoracic spine without and with contrast within normal limits except for minimal disc degeneration.    No results found for: \"25HYDROXY\", \"QIYDIJFY95\", \"TSHULTRASEN\", \"SPIFINTERP\", \"UEN563\", \"TOMAS\", \"HBA1C\", \"LDL\"             OBJECTIVE      Vitals:    09/24/24 1012   Weight: 85.7 kg (189 lb)   Height: 1.753 " "m (5' 9\")     Physical Exam   Spasticity: Trace in bilateral arms          PROCEDURE   N/A  "

## 2024-10-14 RX ORDER — DESIPRAMINE HYDROCHLORIDE 25 MG/1
25 TABLET ORAL
Qty: 90 TABLET | Refills: 1 | Status: SHIPPED | OUTPATIENT
Start: 2024-10-14

## 2024-10-17 ENCOUNTER — APPOINTMENT (OUTPATIENT)
Dept: BEHAVIORAL HEALTH | Facility: CLINIC | Age: 39
End: 2024-10-17
Payer: MEDICARE

## 2024-10-18 DIAGNOSIS — G11.4 HEREDITARY SPASTIC PARAPLEGIA (HCC): ICD-10-CM

## 2024-10-22 RX ORDER — BACLOFEN 20 MG/1
20 TABLET ORAL 3 TIMES DAILY
Qty: 270 TABLET | Refills: 3 | Status: SHIPPED | OUTPATIENT
Start: 2024-10-22 | End: 2025-10-22

## 2024-10-24 ENCOUNTER — OFFICE VISIT (OUTPATIENT)
Dept: NEUROLOGY | Facility: MEDICAL CENTER | Age: 39
End: 2024-10-24
Attending: INTERNAL MEDICINE
Payer: MEDICARE

## 2024-10-24 VITALS
RESPIRATION RATE: 18 BRPM | DIASTOLIC BLOOD PRESSURE: 72 MMHG | HEIGHT: 69 IN | BODY MASS INDEX: 28.08 KG/M2 | WEIGHT: 189.6 LBS | SYSTOLIC BLOOD PRESSURE: 118 MMHG | OXYGEN SATURATION: 97 % | HEART RATE: 70 BPM | TEMPERATURE: 97.5 F

## 2024-10-24 DIAGNOSIS — G11.4 HEREDITARY SPASTIC PARAPLEGIA (HCC): ICD-10-CM

## 2024-10-24 PROCEDURE — G2212 PROLONG OUTPT/OFFICE VIS: HCPCS | Performed by: INTERNAL MEDICINE

## 2024-10-24 PROCEDURE — 3074F SYST BP LT 130 MM HG: CPT | Performed by: INTERNAL MEDICINE

## 2024-10-24 PROCEDURE — 99214 OFFICE O/P EST MOD 30 MIN: CPT | Performed by: INTERNAL MEDICINE

## 2024-10-24 PROCEDURE — 3078F DIAST BP <80 MM HG: CPT | Performed by: INTERNAL MEDICINE

## 2024-10-24 PROCEDURE — 99211 OFF/OP EST MAY X REQ PHY/QHP: CPT | Performed by: INTERNAL MEDICINE

## 2024-10-24 RX ORDER — METHOCARBAMOL 500 MG/1
500 TABLET, FILM COATED ORAL 3 TIMES DAILY
COMMUNITY
Start: 2023-04-01

## 2024-10-24 ASSESSMENT — PATIENT HEALTH QUESTIONNAIRE - PHQ9
5. POOR APPETITE OR OVEREATING: 0 - NOT AT ALL
CLINICAL INTERPRETATION OF PHQ2 SCORE: 2

## 2024-11-07 ENCOUNTER — OFFICE VISIT (OUTPATIENT)
Dept: BEHAVIORAL HEALTH | Facility: CLINIC | Age: 39
End: 2024-11-07
Payer: MEDICARE

## 2024-11-07 DIAGNOSIS — F41.1 GENERALIZED ANXIETY DISORDER: ICD-10-CM

## 2024-11-07 DIAGNOSIS — F43.20 ADJUSTMENT DISORDER, UNSPECIFIED TYPE: ICD-10-CM

## 2024-11-07 DIAGNOSIS — F33.1 MAJOR DEPRESSIVE DISORDER, RECURRENT EPISODE, MODERATE (HCC): ICD-10-CM

## 2024-11-07 PROCEDURE — 90834 PSYTX W PT 45 MINUTES: CPT | Performed by: MARRIAGE & FAMILY THERAPIST

## 2024-11-07 NOTE — PROGRESS NOTES
[facility]   Psychotherapy Progress Note        Name: Yazan Hough III  MRN: [id number]  : 1985  Age: 39 y.o.  Date of assessment: 2024  PCP: Verena Mooney M.D.  Persons in attendance: Patient  Total session time: 50 minutes      Topics addressed in psychotherapy include: Yazan reports that he has been unsuccessful in passing the written portion of his drivers license exam so he has put a hold on pursuit of his drivers license. He has been spending time at his mom's house and feels like he is being stagnant, physically  and mentally. He has decided to visit his dad in the [facility] in a few days and hopes to travel [place] and [place] from [place] to [place] in his fifth wheel and get to enjoy the sights. We discussed the swings that Yazan goes thru from wanting to get his license to giving up on getting his license, in a back and forth ways.  Yazan hopes that this road trip with his dad will reignite his motivation to get his drivers license, find a  and start reaching other life goals also. Supportive psychotherapy provided.     Objective Observations:   Participation:Active verbal participation, Attentive, Engaged, and Open to feedback   Grooming:Casual and Neat   Cognition:Alert and Fully Oriented   Eye Contact:Good   Mood:Euthymic   Affect:Flexible, Expansive, and Congruent with content   Thought Process:Logical and Goal-directed   Speech:Rate within normal limits and Volume within normal limits    Current Risk:   Suicide: low   Homicide: low   Self-Harm: low     Care Plan Updated: Yes    Does patient express agreement with the above plan? Yes     Diagnosis:  1. Adjustment disorder, unspecified type    2. Major depressive disorder, recurrent episode, moderate (HCC)    3. Generalized anxiety disorder        Referral appointment(s) scheduled? Yes       NEGRITA Coffman.

## 2024-12-12 ENCOUNTER — OFFICE VISIT (OUTPATIENT)
Dept: BEHAVIORAL HEALTH | Facility: CLINIC | Age: 39
End: 2024-12-12
Payer: MEDICARE

## 2024-12-12 DIAGNOSIS — F43.20 ADJUSTMENT DISORDER, UNSPECIFIED TYPE: ICD-10-CM

## 2024-12-12 DIAGNOSIS — F33.1 MAJOR DEPRESSIVE DISORDER, RECURRENT EPISODE, MODERATE (HCC): ICD-10-CM

## 2024-12-12 DIAGNOSIS — F10.10 ALCOHOL ABUSE, EPISODIC DRINKING BEHAVIOR: ICD-10-CM

## 2024-12-12 PROCEDURE — 90834 PSYTX W PT 45 MINUTES: CPT | Performed by: MARRIAGE & FAMILY THERAPIST

## 2024-12-13 NOTE — PROGRESS NOTES
[facility]   Psychotherapy Progress Note        Name: Yazan Hough III  MRN: [id number]  : 1985  Age: 39 y.o.  Date of assessment: 2024  PCP: Verena Mooney M.D.  Persons in attendance: Patient  Total session time: 50 minutes      Topics addressed in psychotherapy include: Yazan reports that he has been taking some time to rest and recover from some of the stressors this year before he starts to tackle some goals in the new year that he has been passionate but not ambitious about in . He went to the [facility] to visit his dad for 6 weeks but decided to come back to [place] after just 3 weeks.  He has tenodonitis in his elbow and that is hurting him a lot. Yazan reports that once he is finished with his rest and recovery period leading up to the holidays, his new years resolution is to study and pass his drivers license written exam and then to take the driving exam to get his license. He wants to have license so that he can start going to gym, begin socializing with good friends again, get a job, and maybe get into a relationship. Movitivational interviewing conducted to promote hope and ambition to accomplish identified goals.     Objective Observations:   Participation:Active verbal participation, Attentive, Engaged, and Open to feedback   Grooming:Casual and Neat   Cognition:Alert and Fully Oriented   Eye Contact:Good   Mood:Euthymic   Affect:Flexible, Expansive, and Congruent with content   Thought Process:Logical and Goal-directed   Speech:Rate within normal limits and Volume within normal limits    Current Risk:   Suicide: low   Homicide: low   Self-Harm: low     Care Plan Updated: Yes    Does patient express agreement with the above plan? Yes     Diagnosis:  1. Major depressive disorder, recurrent episode, moderate (HCC)    2. Adjustment disorder, unspecified type    3. Alcohol abuse, episodic drinking behavior        Referral appointment(s) scheduled? Yes        NEGRITA Coffman.

## 2024-12-19 ENCOUNTER — TELEMEDICINE (OUTPATIENT)
Dept: BEHAVIORAL HEALTH | Facility: CLINIC | Age: 39
End: 2024-12-19
Payer: MEDICARE

## 2024-12-19 DIAGNOSIS — F51.01 PRIMARY INSOMNIA: ICD-10-CM

## 2024-12-19 DIAGNOSIS — F33.1 MAJOR DEPRESSIVE DISORDER, RECURRENT EPISODE, MODERATE (HCC): ICD-10-CM

## 2024-12-19 DIAGNOSIS — F41.1 GENERALIZED ANXIETY DISORDER: ICD-10-CM

## 2024-12-19 PROCEDURE — 99214 OFFICE O/P EST MOD 30 MIN: CPT | Mod: 95 | Performed by: PSYCHIATRY & NEUROLOGY

## 2024-12-19 RX ORDER — ESCITALOPRAM OXALATE 20 MG/1
20 TABLET ORAL EVERY EVENING
Qty: 90 TABLET | Refills: 0 | Status: SHIPPED | OUTPATIENT
Start: 2024-12-19 | End: 2025-03-19

## 2024-12-19 NOTE — PROGRESS NOTES
[facility]   Follow Up Assessment  This evaluation was conducted via Teams using secure and encrypted videoconferencing technology. The patient was in their home in the [facility].    The patient's identity was confirmed and verbal consent was obtained for this virtual visit.    This visit was conducted via Zoom using secure and encrypted videoconferencing technology.  The patient was in a private location in the [facility].  The patient's identity was confirmed and verbal consent was obtained for this virtual visit.    This provider informed the patient their medical records are totally confidential except for the use by other providers involved in their care, or if the patient signs a release, or to report instances of child or elder abuse, or if it is determined they are an immediate risk to harm themselves or others.    Name: Yazan Hough III  MRN: [id number]  : 1985  Age: 39 y.o.  Date of assessment: 2024  PCP: Verena Mooney M.D.    Subjective:  Chart reviewed prior to the virtual visit at home.  He was seen most recently on .  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  He would like to continue to take Lexapro 20 mg at bedtime and increase desipramine to 50 mg a.m.  We talked about the risk of using marijuana on a daily basis which could affect any antidepressant.  He previously tried Cymbalta and Prozac.    Objective: He is alert, oriented, and cooperative.  Relatedness is good.  Grooming is good.  Speech is normal rate.  Anxious.  Memory is fair.  Insight and judgment are fair.  No organized delusional system elicited.    Current Risk:       Suicidal: Not suicidal       Homicidal: Not homicidal       Self-Harm: No plan to harm self       Relapse(Low/Moderate/High):: Moderate       Crisis Safety Plan Reviewed: Discussed with patient    Diagnosis:   Major depressive disorder, recurrent  Generalized anxiety  disorder  Insomnia    Treatment Plan:  The current treatment plan consists of a follow-up visit in 1 month and then quarterly psychiatric sessions designed to evaluate his depression, anxiety, and insomnia.    Duration will be for a minimum of 12 months and will be reviewed at each visit.    Goals: Remission of depression with control of anxiety and improved sleep in order to prevent relapse due to the chronic nature of his behavioral health problems and mental illness.  Continue Lexapro 20 mg at bedtime.  Increase desipramine to 50 mg a.m.      Ken Cole M.D.    This note was created using voice recognition software (Dragon). The accuracy of the dictation is limited by the abilities of the software. I have reviewed the note prior to signing, however some errors in grammar and context are still possible. If you have any questions related to this note please do not hesitate to contact our office.

## 2025-02-06 ENCOUNTER — OFFICE VISIT (OUTPATIENT)
Dept: BEHAVIORAL HEALTH | Facility: CLINIC | Age: 40
End: 2025-02-06
Payer: MEDICARE

## 2025-02-06 DIAGNOSIS — F33.41 RECURRENT MAJOR DEPRESSIVE DISORDER, IN PARTIAL REMISSION (HCC): ICD-10-CM

## 2025-02-06 ASSESSMENT — PATIENT HEALTH QUESTIONNAIRE - PHQ9
5. POOR APPETITE OR OVEREATING: NOT AT ALL
4. FEELING TIRED OR HAVING LITTLE ENERGY: SEVERAL DAYS
8. MOVING OR SPEAKING SO SLOWLY THAT OTHER PEOPLE COULD HAVE NOTICED. OR THE OPPOSITE, BEING SO FIGETY OR RESTLESS THAT YOU HAVE BEEN MOVING AROUND A LOT MORE THAN USUAL: SEVERAL DAYS
SUM OF ALL RESPONSES TO PHQ QUESTIONS 1-9: 4
7. TROUBLE CONCENTRATING ON THINGS, SUCH AS READING THE NEWSPAPER OR WATCHING TELEVISION: NOT AT ALL
1. LITTLE INTEREST OR PLEASURE IN DOING THINGS: NOT AT ALL
6. FEELING BAD ABOUT YOURSELF - OR THAT YOU ARE A FAILURE OR HAVE LET YOURSELF OR YOUR FAMILY DOWN: NOT AL ALL
3. TROUBLE FALLING OR STAYING ASLEEP OR SLEEPING TOO MUCH: MORE THAN HALF THE DAYS
2. FEELING DOWN, DEPRESSED, IRRITABLE, OR HOPELESS: NOT AT ALL
9. THOUGHTS THAT YOU WOULD BE BETTER OFF DEAD, OR OF HURTING YOURSELF: NOT AT ALL
SUM OF ALL RESPONSES TO PHQ9 QUESTIONS 1 AND 2: 0

## 2025-02-06 NOTE — PROGRESS NOTES
[facility]   Psychotherapy Progress Note        Name: Yazan Hough III  MRN: [id number]  : 1985  Age: 39 y.o.  Date of assessment: 2025  PCP: Verena Mooney M.D.  Persons in attendance: Patient  Total session time: 50 minutes      Topics addressed in psychotherapy include: Depression screening conducted, clinically diagnosable symptoms of depression are currently in remission status. 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 He is passing practice tests. He wants to be able to work out, to be more independent, and to start taking control of his life. He identifies that he has finally become accepting of his progressive muscle condition and that was a big step. We discussed in what ways he is accepting , and what kind of changes come with that acceptance. Yazan also reports he stopped smoking cannabis due to fear of developing a worsening lung condition and he has been using only edibles and gummies. Solution focused interviewing conducted.           2024    10:00 AM 10/24/2024    10:40 AM 2025    11:03 AM   Depression Screen (PHQ-2/PHQ-9)   PHQ-2 Total Score   0   PHQ-2 Total Score 0 2    PHQ-9 Total Score   4       Interpretation of PHQ-9 Total Score   Score Severity   1-4 No Depression   5-9 Mild Depression   10-14 Moderate Depression   15-19 Moderately Severe Depression   20-27 Severe Depression     Objective Observations:   Participation:Active verbal participation, Attentive, Engaged, and Open to feedback   Grooming:Casual and Neat   Cognition:Alert and Fully Oriented   Eye Contact:Good   Mood:Euthymic   Affect:Flexible, Full range, Expansive, and Congruent with content   Thought Process:Logical and Goal-directed   Speech:Rate within  normal limits and Volume within normal limits    Current Risk:   Suicide: low   Homicide: low   Self-Harm: low     Care Plan Updated: Yes    Does patient express agreement with the above plan? Yes     Diagnosis:  1. Recurrent major depressive disorder, in partial remission (HCC)        Referral appointment(s) scheduled? No       NEGRITA Coffman.

## 2025-02-18 ENCOUNTER — TELEPHONE (OUTPATIENT)
Dept: NEUROLOGY | Facility: MEDICAL CENTER | Age: 40
End: 2025-02-18
Payer: MEDICARE

## 2025-02-20 ENCOUNTER — OFFICE VISIT (OUTPATIENT)
Dept: NEUROLOGY | Facility: MEDICAL CENTER | Age: 40
End: 2025-02-20
Attending: INTERNAL MEDICINE
Payer: MEDICARE

## 2025-02-20 VITALS
HEART RATE: 90 BPM | WEIGHT: 192 LBS | BODY MASS INDEX: 28.44 KG/M2 | TEMPERATURE: 97.4 F | HEIGHT: 69 IN | OXYGEN SATURATION: 99 % | DIASTOLIC BLOOD PRESSURE: 60 MMHG | SYSTOLIC BLOOD PRESSURE: 118 MMHG

## 2025-02-20 DIAGNOSIS — G11.4 HEREDITARY SPASTIC PARAPARESIS (HCC): ICD-10-CM

## 2025-02-20 PROCEDURE — 3078F DIAST BP <80 MM HG: CPT | Performed by: INTERNAL MEDICINE

## 2025-02-20 PROCEDURE — 99212 OFFICE O/P EST SF 10 MIN: CPT | Performed by: INTERNAL MEDICINE

## 2025-02-20 PROCEDURE — G2211 COMPLEX E/M VISIT ADD ON: HCPCS | Performed by: INTERNAL MEDICINE

## 2025-02-20 PROCEDURE — 3074F SYST BP LT 130 MM HG: CPT | Performed by: INTERNAL MEDICINE

## 2025-02-20 PROCEDURE — 99214 OFFICE O/P EST MOD 30 MIN: CPT | Performed by: INTERNAL MEDICINE

## 2025-02-20 ASSESSMENT — PATIENT HEALTH QUESTIONNAIRE - PHQ9: CLINICAL INTERPRETATION OF PHQ2 SCORE: 0

## 2025-02-20 NOTE — PROGRESS NOTES
John Tobar,   Neurology, Movement Disorders [facility]  [address], [address]. [place] [address]  Phone: [phone], Fax: [phone]     ASSESSMENT / PLAN   Yazan Hough III is a 39 y.o. LHD male presenting for spasticity.     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 MRI Brain findings also consistent with HSP 15.      Exam notable for generalized spasticity, worse in legs. No signs of neuropathy. He has pain in legs when walking, with spasticity in knee flexion and ankle inversion. He is open to continuing botox to relax the muscles for better posture. Discussed referral for baclofen pump in future.      - PT referral to [facility] [address] , [place] [address]  - baclofen 20mg 3x/day. Avoid taking at the same time as methocarbamol.  - Plan for higher dose of Botox 600U but cost prohibitive. Will sort out billing issues first.         Orders Placed This Encounter    Referral to Physical Therapy     Return in about 4 months (around 6/20/2025).    BILLING DOCUMENTATION:   Excluding time spent on procedures during visit, I spent 30 minutes reviewing the medical record, interviewing and examining the patient, discussing diagnosis and treatment, and coordinating care.                HISTORY OF PRESENT ILLNESS   Yazan Hough III is a 39 y.o. LHD male presenting for spasticity.     Initial HPI 03/26/24  Last visit with Dr. Luther 11/2023     Older sister diagnosed with hereditary spastic paraplegia 15, autosomal recessive mutation [id number]. Onset approx 31yo. Diagnosed at [place]. Now in her 40s and in wheelchair. Neuropathy in legs and now starting in the arms. Followed at [place].   Mother: had genetic test at [place] that was positive.   Biological father: didn't get test  Youngest brother: walking  starting to change his gait. At the gym, fell backwards. Now 36yo.   No other relatives aware of with similar diagnosis.      Childhood without issues: played football, wrestling.   Onset approx 2017/2018. Was working with [employer] but legs started giving him issues. Started to hurt, trouble walking.   Now is having some pain in knee caps, legs. Worsened when walking. No pain/burning or numbness in feet. Not working with PT now.       Interval History  3/2024: first visit with me. No med changes  06/28/24: botox #1. Able to straighten out legs more, but ankles seemed unchanged. OOP cost $600 and can't continue  09/24/24: no med changes  10/24/24: referral to PT and OT but didn't start  02/20/25: resent PT to [facility]       Current regimen  Baclofen 20mg TID  methocarbamol 500mg QID as needed  Botox last on 6/28/2024       ROS  MSK:   more consistently taking baclofen now, some benefit.   Last drove 5 months ago due to DUI. Only having trouble walking, but no issues with controlling ankles and foot. Mother does not have concern with him driving when they were practicing in safe area. He notes driving using more motion at the knee/hip to control pedals rather than at ankles.  Pain when walking, knees lock out and hyperextend. Limited in his ambulation  02/20/25: Using scooter to helps with balance  :   Notes bladder spasticity, pending follow up with urology.   02/20/25: Pending bladder stimulation.   Mood:   seeing Dr. Cole. Added desipramine recently. Mood doing OK.   02/20/25: Working with therapist. PHQ-9 = 0, C-SSRS: neg     No past medical history on file.  No past surgical history on file.  No family history on file.  Social History     Socioeconomic History    Marital status: Single     Spouse name: Not on file    Number of children: Not on file    Years of education: Not on file    Highest education level: Not on file   Occupational History    Not on file   Tobacco Use    Smoking  "status: Former     Types: Cigarettes    Smokeless tobacco: Never   Vaping Use    Vaping status: Never Used   Substance and Sexual Activity    Alcohol use: Yes     Comment: occ    Drug use: Yes     Types: Inhaled, Marijuana     Comment: marijuana    Sexual activity: Not on file   Other Topics Concern    Not on file   Social History Narrative    ** Merged History Encounter **          Social Drivers of Health     Financial Resource Strain: Not on file   Food Insecurity: Not on file   Transportation Needs: Not on file   Physical Activity: Not on file   Stress: Not on file   Social Connections: Not on file   Intimate Partner Violence: Not on file   Housing Stability: Not on file     Current Outpatient Medications   Medication    escitalopram (LEXAPRO) 20 MG tablet    methocarbamol (ROBAXIN) 500 MG Tab    baclofen (LIORESAL) 20 MG tablet    desipramine (NORPRAMIN) 25 MG Tab    gabapentin (NEURONTIN) 300 MG Cap     No current facility-administered medications for this visit.     Allergies   Allergen Reactions    Codeine Nausea    Hydrocodone     Morphine              DATA / RESULTS     MRI Brain with/without 3/2024  There subtle increased T2 signal intensity in the bilateral cerebral white matter. This can be seen in the patients with very minimal periventricular leukomalacia which can be secondary to the peripartum anoxia. Otherwise unremarkable pre and postcontrast MR examination of the brain.     Per my review, he does appear to have thinning anterior corpus callosum and \"ears of the lynx sign\" on ventricles            MRI C-spine 9/2023:  1.  Degenerative disease in the cervical spine as described above.  2.  There is severe left and moderate right C6 neural foraminal stenosis.  3.  Moderate to severe bilateral C4 neural foraminal stenosis.     MRI T-spine 9/2023:  MRI of the thoracic spine without and with contrast within normal limits except for minimal disc degeneration.    No results found for: \"25HYDROXY\", " "\"RAHCYHKF36\", \"TSHULTRASEN\", \"SPIFINTERP\", \"NBR462\", \"TOMAS\", \"HBA1C\", \"LDL\"             OBJECTIVE      Vitals:    02/20/25 1035   BP: 118/60   BP Location: Right arm   Patient Position: Sitting   BP Cuff Size: Adult   Pulse: 90   Temp: 36.3 °C (97.4 °F)   TempSrc: Temporal   SpO2: 99%   Weight: 87.1 kg (192 lb)   Height: 1.753 m (5' 9\")     Physical Exam  Modified Efrem Scale:  Bilateral arms = 1  Bilateral knee = 2    Knee flexion, ankle plantarflexion, inversion    Limited ROM at ankles (toe tapping) but good knee flexion/extension (heel stomping)        PROCEDURE   N/A  "

## 2025-02-22 NOTE — Clinical Note
REFERRAL APPROVAL NOTICE         Sent on February 21, 2025                   Yazan Hough III  [address] [facility] [place] [address]                   Dear Mr. Hough,    After a careful review of the medical information and benefit coverage, [facility] has processed your referral. See below for additional details.    If applicable, you must be actively enrolled with your insurance for coverage of the authorized service. If you have any questions regarding your coverage, please contact your insurance directly.    REFERRAL INFORMATION   Referral #:  [id number]  Referred-To Provider    Referred-By Provider:  Physical Therapy    John Tobar D.O.   [facility]      [address]  [place] [address]  [phone] [id number]  [facility] [address]  [phone]    Referral Start Date:  02/20/2025  Referral End Date:   02/20/2026             SCHEDULING  If you do not already have an appointment, please call [phone] to make an appointment.     MORE INFORMATION  If you do not already have a [employer] account, sign up at: [email or web address]  You can access your medical information, make appointments, see lab results, billing information, and more.  If you have questions regarding this referral, please contact  the [facility] Referrals department at:             [phone]. Monday - Friday 8:00AM - 5:00PM.     Sincerely,    [facility]

## 2025-03-05 ENCOUNTER — PATIENT MESSAGE (OUTPATIENT)
Dept: BEHAVIORAL HEALTH | Facility: CLINIC | Age: 40
End: 2025-03-05
Payer: MEDICARE

## 2025-03-06 ENCOUNTER — APPOINTMENT (OUTPATIENT)
Dept: BEHAVIORAL HEALTH | Facility: CLINIC | Age: 40
End: 2025-03-06
Payer: MEDICARE

## 2025-04-07 RX ORDER — DESIPRAMINE HYDROCHLORIDE 25 MG/1
25 TABLET ORAL
Qty: 90 TABLET | Refills: 1 | Status: SHIPPED | OUTPATIENT
Start: 2025-04-07

## 2025-05-08 ENCOUNTER — APPOINTMENT (OUTPATIENT)
Dept: BEHAVIORAL HEALTH | Facility: CLINIC | Age: 40
End: 2025-05-08
Payer: MEDICARE

## 2025-06-12 ENCOUNTER — APPOINTMENT (OUTPATIENT)
Dept: BEHAVIORAL HEALTH | Facility: CLINIC | Age: 40
End: 2025-06-12
Payer: MEDICARE

## 2025-06-18 ENCOUNTER — TELEPHONE (OUTPATIENT)
Dept: NEUROLOGY | Facility: MEDICAL CENTER | Age: 40
End: 2025-06-18
Payer: MEDICARE

## 2025-06-18 NOTE — TELEPHONE ENCOUNTER
NEUROLOGY PATIENT PRE-VISIT PLANNING     Patient was NOT contacted to complete PVP.  Note: Patient will not be contacted if there is no indication to call.     Patient Appointment is scheduled as: Established Patient     Is visit type and length scheduled correctly? Yes    [facility] is checked in Patient Demographics? Yes    3.   Is referral attached to visit? Yes    4. Were records received from referring provider? Yes    4. Patient was NOT contacted to have someone accompany them to visit.     5. Is this appointment scheduled as a Hospital Follow-Up?  No    6. Does the patient require any pre procedure or post procedure follow up? No    7. If any orders were placed at last visit or intended to be done for this visit do we have Results/Consult Notes? Yes  Labs - Labs were not ordered at last office visit.  Imaging - Imaging was not ordered at last office visit.  Referrals - Referral ordered, patient has NOT been seen.  Note: If patient appointment is for lab or imaging review and patient did not complete the studies, check with provider if OK to reschedule patient until completed.    8. If patient appointment is for Botox - is order pended for provider? N/A    9. Was Plan Assessment from last Neurology Office Visit Reviewed?  Yes

## 2025-06-19 ENCOUNTER — OFFICE VISIT (OUTPATIENT)
Dept: NEUROLOGY | Facility: MEDICAL CENTER | Age: 40
End: 2025-06-19
Attending: INTERNAL MEDICINE
Payer: MEDICARE

## 2025-06-19 VITALS
DIASTOLIC BLOOD PRESSURE: 78 MMHG | HEART RATE: 82 BPM | WEIGHT: 178 LBS | HEIGHT: 69 IN | BODY MASS INDEX: 26.36 KG/M2 | RESPIRATION RATE: 16 BRPM | SYSTOLIC BLOOD PRESSURE: 96 MMHG | OXYGEN SATURATION: 98 % | TEMPERATURE: 98.6 F

## 2025-06-19 DIAGNOSIS — M79.2 NEUROPATHIC PAIN: ICD-10-CM

## 2025-06-19 DIAGNOSIS — G11.4 HEREDITARY SPASTIC PARAPLEGIA (HCC): ICD-10-CM

## 2025-06-19 PROCEDURE — 3078F DIAST BP <80 MM HG: CPT | Performed by: INTERNAL MEDICINE

## 2025-06-19 PROCEDURE — G2211 COMPLEX E/M VISIT ADD ON: HCPCS | Performed by: INTERNAL MEDICINE

## 2025-06-19 PROCEDURE — 3074F SYST BP LT 130 MM HG: CPT | Performed by: INTERNAL MEDICINE

## 2025-06-19 PROCEDURE — 99215 OFFICE O/P EST HI 40 MIN: CPT | Performed by: INTERNAL MEDICINE

## 2025-06-19 PROCEDURE — 99213 OFFICE O/P EST LOW 20 MIN: CPT | Performed by: INTERNAL MEDICINE

## 2025-06-19 RX ORDER — GABAPENTIN 300 MG/1
300 CAPSULE ORAL 3 TIMES DAILY
Qty: 270 CAPSULE | Refills: 3 | Status: SHIPPED | OUTPATIENT
Start: 2025-06-19

## 2025-06-19 RX ORDER — ESCITALOPRAM OXALATE 20 MG/1
20 TABLET ORAL EVERY EVENING
COMMUNITY

## 2025-06-19 RX ORDER — BACLOFEN 20 MG/1
20 TABLET ORAL 3 TIMES DAILY
Qty: 270 TABLET | Refills: 3 | Status: SHIPPED | OUTPATIENT
Start: 2025-06-19 | End: 2026-06-19

## 2025-06-19 ASSESSMENT — PATIENT HEALTH QUESTIONNAIRE - PHQ9
SUM OF ALL RESPONSES TO PHQ QUESTIONS 1-9: 9
CLINICAL INTERPRETATION OF PHQ2 SCORE: 2
5. POOR APPETITE OR OVEREATING: 1 - SEVERAL DAYS

## 2025-06-19 NOTE — PATIENT INSTRUCTIONS
Overview  The components of a baclofen pump and their placement in the human body  A baclofen pump delivers a muscle relaxant medicine to your spinal canal.        What is a baclofen pump?  A baclofen pump is a surgically implanted device that continuously delivers baclofen, a medication, to your spinal canal. Your spinal canal is a fluid-filled space between your spinal cord and vertebrae.    The baclofen pump consists of a pump and a catheter. The catheter is a thin, flexible tube that brings the medication from the pump to the fluid in your spinal canal.    The pump is a round metallic disc (about 1 inch wide and 3 inches in diameter). A surgeon will perform a procedure to implant this device under the skin of your abdomen.    The pump contains a battery, a reservoir for the medication and a microprocessor. Your healthcare provider will program the pump with a small computer that communicates with the pump via a wand placed over the device in your skin. The programming tells the pump to give you medication on schedule. Your provider will also refill the medication periodically in the reservoir by inserting a needle into the pump and injecting medication into it.    You may hear your healthcare provider refer to a baclofen pump as intrathecal baclofen therapy (ITB).      What is baclofen?  Baclofen is a skeletal muscle relaxant medication. It’s a common medication to treat spasticity (muscle stiffness) and other spinal cord conditions. Within your spinal cord, baclofen:  Improves muscle movement, overactive reflexes and excessive muscle tone.  Decreases muscle spasms.      What conditions does a baclofen pump manage?  A baclofen pump can help you manage spasticity caused by many conditions affecting the brain or spinal cord, like  Multiple sclerosis.  Spinal cord injury.  Cerebral palsy.  Traumatic brain injury.  Stroke.      How common is a baclofen pump?  The baclofen pump isn’t the most commonly used treatment  for spasticity. One study found that healthcare providers prescribe a baclofen pump to just over 1,480 children (under age 18) and more than 7,080 adults (ages 18 to 64) annually in the [place].      Who is a good candidate for a baclofen pump?  A healthcare provider might recommend a baclofen pump if you have severe spasticity and oral medications aren’t helpful. They may refer you for a consultation to evaluate if a baclofen pump could be a good option.    The evaluation consists of a test injection of baclofen into your spinal canal. Your provider will perform a spinal tap and inject a small dose of baclofen into your spinal fluid during the test. Your care team will evaluate the effects of the injection after a few hours. The effects of the medication are temporary but provide very useful information that helps with the decision process.        Procedure Details  What is baclofen pump surgery?  Baclofen pump surgery is a procedure to permanently implant a device into your body so it can administer baclofen medicine into the fluid in your spinal column. A neurosurgeon or a pain management physician performs this procedure.    On the day of surgery, you’ll receive general anesthesia. This puts you to sleep so you won’t feel pain. Your surgeon will:  Make a small incision (cut) in your skin to place the pump in your abdomen.  Connect a tube, called a catheter, from the pump to the fluid in your spinal column. A small incision in your lower back is needed to push the end of the catheter into your spine.  Fill the pump with baclofen.  Program the pump using a computer to give you a specific dose of baclofen throughout the day.  The surgery can take up to two hours.    After the surgery, you’ll stay in the hospital for a short time. In some cases, your surgeon will recommend inpatient rehabilitation to help you adjust to the pump before returning home.    Baclofen pump placement  The most common placement for a  baclofen pump is underneath the skin in your abdomen beside your belly button. Your surgeon will determine the best placement for your pump based on your age, body composition and general health.    Baclofen pump management  Your provider will educate you on the device so you’re informed about how it works and what you need to do to maintain it.    The pump needs to be refilled at regular intervals (usually every one to six months) by a trained provider. They’ll refill the pump by inserting a needle through your skin into a refill port on the pump. In some cases, refills can be done at home, but follow-up visits with a provider are necessary one to two times per year to ensure that the therapy is working appropriately.    Your provider can adjust the dose of baclofen at any time during a visit. Missing a pump refill appointment may lead to baclofen withdrawal.    When the pump battery approaches the end of its life, the pump needs to be replaced (but not the catheter). When there’s a problem with the baclofen pump or the catheter, they may also need to be surgically replaced or repositioned.    Seek medical attention as soon as possible if you suspect the pump isn’t working as it should.      Risks / Benefits  What are the benefits of this treatment?  The benefits of a baclofen pump include:  Effective at managing spasticity from a variety of brain and spinal cord conditions.  Continuous medication delivery.  Fewer side effects than oral baclofen.  Option to program different doses at different times of the day to address your specific needs.  The pump and the catheter can be surgically removed if needed.  What are the side effects or complications of a baclofen pump?  The baclofen pump needs to be surgically implanted into your body under anesthesia. The following complications are possible after surgery:    Infection at the surgical site.  Bleeding, bruising.  Leakage of spinal fluid.  The most common side effect  of a baclofen pump is increased weakness. Weakness may improve by reducing the dose of baclofen from the pump.    Complications more specific to the pump and catheter are rare but could include:  Pump mechanical failure or malfunction.  Catheter displacement or blockage.  Skin breakdown with exposure of the pump or catheter.  Baclofen withdrawal.  Baclofen overdose.  Serious complications aren’t common but some can be life-threatening. In most cases, they’re reversible as long as a healthcare provider treats them quickly.    Baclofen withdrawal symptoms  Baclofen withdrawal happens if you experience an interruption of the delivery of baclofen via the pump or catheter. You’ll also likely experience withdrawal symptoms if your baclofen pump runs out of baclofen. The following symptoms may be suggestive of baclofen withdrawal:    Severe muscle stiffness or spasms.  Itchy skin without rash.  Severe discomfort or pain.  Sweating.  Low-grade fever.  Dizziness.  Nausea.  Seizures.  In some cases, baclofen withdrawal can be life-threatening. Talk to your healthcare provider about how to maintain your pump so you can prevent these side effects. Seek help from a healthcare provider immediately if you suspect you have baclofen withdrawal.    Baclofen overdose symptoms  Baclofen overdose is a rare complication of the baclofen pump. Baclofen overdose generally happens close to a baclofen pump refill or change in programming. Symptoms of baclofen overdose can also happen when you have another acute medical condition. They include:    Drowsiness.  Very loose and weak muscles.  Difficulty standing up, sitting up or moving your arms and legs.  Low blood pressure.  Low body temperature.  Difficulty breathing.  Loss of consciousness.  Seizures.  Baclofen overdose can also be life-threatening. You should seek medical attention as soon as possible if you suspect you have a baclofen overdose.    Recovery and [place]  What is the recovery  time for baclofen pump surgery?  The average recovery time after baclofen pump surgery is six to eight weeks. Your surgeon will give you a more specific timeframe based on your situation.    After surgery, avoid strenuous physical activities until given the “all clear,” and get plenty of rest.    You may be asked to wear an abdominal binder for a few weeks after the surgery. You may need to stay a few days in the hospital after the implant procedure and participate in rehabilitation.    Is a baclofen pump permanent?  Yes. A surgeon will permanently implant a baclofen pump and catheter into your body. If necessary, your surgeon can remove the pump and catheter.    How long does a baclofen pump battery last?  A baclofen pump battery usually lasts around six to seven years. The timeframe may vary. Your provider will get an estimated battery duration when reading the information from the pump.    You’ll need another surgery to replace the pump before the battery expires. This surgery is usually faster than the initial placement surgery, as the catheter is already in place, and in most cases, you’ll be able to go home the same day.      Additional Common Questions  Does insurance cover a baclofen pump?  Because baclofen is an approved treatment by the U.S. Food & Drug Administration (FDA), health insurance usually covers this cost or a portion of it, including Medicare and Medicaid. However, as coverage varies greatly between individual insurance plans, check with your insurance provider to learn more.    What is the difference between oral baclofen and a pump?  The main difference between the two types of baclofen is how you take the medication. Oral baclofen is typically the initial form of treatment for mild to moderate forms of spasticity. You can take this medication by mouth several times per day.    A baclofen pump delivers a liquid form of baclofen directly into your spinal fluid, using a device implanted into  your body. A baclofen pump is FDA-approved for the treatment of severe spasticity.

## 2025-06-19 NOTE — PROGRESS NOTES
John Tobar,   Neurology, Movement Disorders [facility]  [address], [address]. [place] [address]  Phone: [phone], Fax: [phone]     ASSESSMENT / PLAN   Yazan Hough III is a 39 y.o. LHD male presenting for spasticity.     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 MRI Brain findings also consistent with HSP 15.      Exam notable for generalized spasticity, worse in legs. No signs of neuropathy. He has pain in legs when walking, with spasticity in knee flexion and ankle inversion.      - PT unable to attend due to transportation limitations    - baclofen 20mg 3x/day. Avoid taking at the same time as methocarbamol  - Possible mild benefit with dalfampridine in HSP (dosed 10mg BID in studies). Rx to [facility]  doi: [id number]  doi: [email or web address]    - Consider higher dose of Botox 600U but cost prohibitive  - Consider referral to discuss baclofen pump with [facility]      Orders Placed This Encounter    escitalopram (LEXAPRO) 20 MG tablet    baclofen (LIORESAL) 20 MG tablet    gabapentin (NEURONTIN) 300 MG Cap     Return in 29 days (on 7/18/2025) for Virtual visit, 20 min slot.    BILLING DOCUMENTATION:   Excluding time spent on procedures during visit, I spent 40 minutes reviewing the medical record, interviewing and examining the patient, discussing diagnosis and treatment, and coordinating care.                HISTORY OF PRESENT ILLNESS   Yazan Hough III is a 39 y.o. LHD male presenting for spasticity.     Initial HPI 03/26/24  Last visit with Dr. Luther 11/2023     Older sister diagnosed with hereditary spastic paraplegia 15, autosomal recessive mutation [id number]. Onset approx 29yo. Diagnosed at [place]. Now in her 40s and in wheelchair. Neuropathy in legs  and now starting in the arms. Followed at [facility].   Mother: had genetic test at [facility] that was positive.   Biological father: didn't get test  Youngest brother: walking starting to change his gait. At the gym, fell backwards. Now 36yo.   No other relatives aware of with similar diagnosis.      Childhood without issues: played football, wrestling.   Onset approx 2017/2018. Was working with [employer] but legs started giving him issues. Started to hurt, trouble walking.   Now is having some pain in knee caps, legs. Worsened when walking. No pain/burning or numbness in feet. Not working with PT now.       Interval History  3/2024: first visit with me. No med changes  06/28/24: botox #1. Able to straighten out legs more, but ankles seemed unchanged. OOP cost $600 and can't continue  09/24/24: no med changes  10/24/24: referral to PT and OT but didn't start  02/20/25: resent PT to [facility] but didn't start  06/19/25: referral for discussion about intrathecal baclofen pump       Current regimen  Baclofen 20mg TID  methocarbamol 500mg QID as needed  Botox last on 6/28/2024       ROS  MSK:   more consistently taking baclofen now, some benefit.   Last drove 5 months ago due to DUI. Only having trouble walking, but no issues with controlling ankles and foot. Mother does not have concern with him driving when they were practicing in safe area. He notes driving using more motion at the knee/hip to control pedals rather than at ankles.  Pain when walking, knees lock out and hyperextend. Limited in his ambulation  02/20/25: Using scooter to helps with balance  :   Notes bladder spasticity, pending follow up with urology.   02/20/25: Pending bladder stimulator.   Mood:   seeing Dr. Cole. Added desipramine recently. Mood doing OK.   02/20/25: Working with therapist. PHQ-9 = 0, C-SSRS: neg     No past medical history on file.  No past surgical history on file.  No family history on file.  Social History  "    Socioeconomic History    Marital status: Single     Spouse name: Not on file    Number of children: Not on file    Years of education: Not on file    Highest education level: Not on file   Occupational History    Not on file   Tobacco Use    Smoking status: Former     Types: Cigarettes    Smokeless tobacco: Never   Vaping Use    Vaping status: Never Used   Substance and Sexual Activity    Alcohol use: Yes     Comment: occ    Drug use: Yes     Types: Inhaled, Marijuana     Comment: marijuana    Sexual activity: Not on file   Other Topics Concern    Not on file   Social History Narrative    ** Merged History Encounter **          Social Drivers of Health     Financial Resource Strain: Not on file   Food Insecurity: Not on file   Transportation Needs: Not on file   Physical Activity: Not on file   Stress: Not on file   Social Connections: Not on file   Intimate Partner Violence: Not on file   Housing Stability: Not on file     Current Outpatient Medications   Medication    baclofen (LIORESAL) 20 MG tablet    gabapentin (NEURONTIN) 300 MG Cap    desipramine (NORPRAMIN) 25 MG Tab    methocarbamol (ROBAXIN) 500 MG Tab    escitalopram (LEXAPRO) 20 MG tablet     No current facility-administered medications for this visit.     Allergies   Allergen Reactions    Codeine Nausea    Hydrocodone     Morphine              DATA / RESULTS     MRI Brain with/without 3/2024  There subtle increased T2 signal intensity in the bilateral cerebral white matter. This can be seen in the patients with very minimal periventricular leukomalacia which can be secondary to the peripartum anoxia. Otherwise unremarkable pre and postcontrast MR examination of the brain.     Per my review, he does appear to have thinning anterior corpus callosum and \"ears of the lynx sign\" on ventricles            MRI C-spine 9/2023:  1.  Degenerative disease in the cervical spine as described above.  2.  There is severe left and moderate right C6 neural foraminal " "stenosis.  3.  Moderate to severe bilateral C4 neural foraminal stenosis.     MRI T-spine 9/2023:  MRI of the thoracic spine without and with contrast within normal limits except for minimal disc degeneration.    No results found for: \"25HYDROXY\", \"UUUVHLSE25\", \"TSHULTRASEN\", \"SPIFINTERP\", \"OIQ784\", \"TOMAS\", \"HBA1C\", \"LDL\"             OBJECTIVE      Vitals:    06/19/25 1025   BP: 96/78   BP Location: Left arm   Patient Position: Sitting   BP Cuff Size: Adult   Pulse: 82   Resp: 16   Temp: 37 °C (98.6 °F)   TempSrc: Temporal   SpO2: 98%   Weight: 80.7 kg (178 lb)   Height: 1.753 m (5' 9\")       Physical Exam  Modified Efrem Scale:  Bilateral arms = 1  Bilateral knee = 2    Knee flexion, ankle plantarflexion, inversion    Limited ROM at ankles (toe tapping) but good knee flexion/extension (heel stomping)        PROCEDURE   N/A  "

## 2025-07-18 ENCOUNTER — TELEMEDICINE (OUTPATIENT)
Dept: NEUROLOGY | Facility: MEDICAL CENTER | Age: 40
End: 2025-07-18
Attending: INTERNAL MEDICINE
Payer: MEDICARE

## 2025-07-18 VITALS — WEIGHT: 178 LBS | BODY MASS INDEX: 26.36 KG/M2 | HEIGHT: 69 IN

## 2025-07-18 DIAGNOSIS — G11.4 HEREDITARY SPASTIC PARAPARESIS (HCC): Primary | ICD-10-CM

## 2025-07-18 PROCEDURE — G2211 COMPLEX E/M VISIT ADD ON: HCPCS | Mod: 95 | Performed by: INTERNAL MEDICINE

## 2025-07-18 PROCEDURE — 99213 OFFICE O/P EST LOW 20 MIN: CPT | Mod: 95 | Performed by: INTERNAL MEDICINE

## 2025-07-18 PROCEDURE — 999999 HB NO CHARGE

## 2025-07-18 ASSESSMENT — PATIENT HEALTH QUESTIONNAIRE - PHQ9: CLINICAL INTERPRETATION OF PHQ2 SCORE: 0

## 2025-07-18 NOTE — PROGRESS NOTES
John Tobar,   Neurology, Movement Disorders [facility]  [address], [address]. [place] [address]  Phone: [phone], Fax: [phone]     ASSESSMENT / PLAN   Yazan Hough III is a 39 y.o. LHD male presenting for spasticity.     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 MRI Brain findings also consistent with HSP 15.      Exam notable for generalized spasticity, worse in legs. No signs of neuropathy. He has pain in legs when walking, with spasticity in knee flexion and ankle inversion.     They brought up studies showing mild benefit with dalfampridine in HSP (dosed 10mg BID in studies) but would like to hold off on starting.   doi: [id number]  doi: [email or web address]/[email or web address]     - PT unable to attend due to transportation limitations  - baclofen 20mg 3x/day. Avoid taking at the same time as methocarbamol  - Consider higher dose of Botox 600U but cost prohibitive  - Consider referral to discuss baclofen pump with [facility]      No orders of the defined types were placed in this encounter.    Return in about 6 months (around 1/18/2026).    BILLING DOCUMENTATION:   Excluding time spent on procedures during visit, I spent 20 minutes reviewing the medical record, interviewing and examining the patient, discussing diagnosis and treatment, and coordinating care.    This evaluation was conducted via [employer] Teams using secure and encrypted videoconferencing technology. The patient was in their home in the [facility].    The patient's identity was confirmed and verbal consent was obtained for this virtual visit.                HISTORY OF PRESENT ILLNESS   Yazan Hough III is a 39 y.o. LHD male presenting for spasticity.     Initial HPI 03/26/24  Last visit with Dr. Luther 11/2023      Older sister diagnosed with hereditary spastic paraplegia 15, autosomal recessive mutation [id number]. Onset approx 31yo. Diagnosed at [place]. Now in her 40s and in wheelchair. Neuropathy in legs and now starting in the arms. Followed at [facility].   Mother: had genetic test at [facility] that was positive.   Biological father: didn't get test  Youngest brother: walking starting to change his gait. At the gym, fell backwards. Now 36yo.   No other relatives aware of with similar diagnosis.      Childhood without issues: played football, wrestling.   Onset approx 2017/2018. Was working with [employer] but legs started giving him issues. Started to hurt, trouble walking.   Now is having some pain in knee caps, legs. Worsened when walking. No pain/burning or numbness in feet. Not working with PT now.       Interval History  3/2024: first visit with me. No med changes  06/28/24: botox #1. Able to straighten out legs more, but ankles seemed unchanged. OOP cost $600 and can't continue  09/24/24: no med changes  10/24/24: referral to PT and OT but didn't start  02/20/25: resent PT to [facility] but didn't start  06/19/25: no med changes  Sister tried dalfampridine, caused sfx diarrhea. She also recently had ITB pump placed at [facility].  07/18/25: no med changes       Current regimen  Baclofen 20mg TID  methocarbamol 500mg QID as needed  Botox last on 6/28/2024       ROS  MSK:   more consistently taking baclofen now, some benefit.   Last drove 5 months ago due to DUI. Only having trouble walking, but no issues with controlling ankles and foot. Mother does not have concern with him driving when they were practicing in safe area. He notes driving using more motion at the knee/hip to control pedals rather than at ankles.  Pain when walking, knees lock out and hyperextend. Limited in his ambulation  02/20/25: Using scooter to helps with balance  :   Notes bladder spasticity, pending follow up with urology.    02/20/25: Pending bladder stimulator.   Mood:   seeing Dr. Cole. Added desipramine recently. Mood doing OK.   02/20/25: Working with therapist. PHQ-9 = 0, C-SSRS: neg     No past medical history on file.  No past surgical history on file.  No family history on file.  Social History     Socioeconomic History    Marital status: Single     Spouse name: Not on file    Number of children: Not on file    Years of education: Not on file    Highest education level: Not on file   Occupational History    Not on file   Tobacco Use    Smoking status: Former     Types: Cigarettes    Smokeless tobacco: Never   Vaping Use    Vaping status: Never Used   Substance and Sexual Activity    Alcohol use: Yes     Comment: occ    Drug use: Yes     Types: Inhaled, Marijuana     Comment: marijuana    Sexual activity: Not on file   Other Topics Concern    Not on file   Social History Narrative    ** Merged History Encounter **          Social Drivers of Health     Financial Resource Strain: Not on file   Food Insecurity: Not on file   Transportation Needs: Not on file   Physical Activity: Not on file   Stress: Not on file   Social Connections: Not on file   Intimate Partner Violence: Not on file   Housing Stability: Not on file     Current Outpatient Medications   Medication    escitalopram (LEXAPRO) 20 MG tablet    baclofen (LIORESAL) 20 MG tablet    gabapentin (NEURONTIN) 300 MG Cap    desipramine (NORPRAMIN) 25 MG Tab    methocarbamol (ROBAXIN) 500 MG Tab     No current facility-administered medications for this visit.     Allergies   Allergen Reactions    Codeine Nausea    Hydrocodone     Morphine              DATA / RESULTS     MRI Brain with/without 3/2024  There subtle increased T2 signal intensity in the bilateral cerebral white matter. This can be seen in the patients with very minimal periventricular leukomalacia which can be secondary to the peripartum anoxia. Otherwise unremarkable pre and postcontrast MR examination of the  "brain.     Per my review, he does appear to have thinning anterior corpus callosum and \"ears of the lynx sign\" on ventricles            MRI C-spine 9/2023:  1.  Degenerative disease in the cervical spine as described above.  2.  There is severe left and moderate right C6 neural foraminal stenosis.  3.  Moderate to severe bilateral C4 neural foraminal stenosis.     MRI T-spine 9/2023:  MRI of the thoracic spine without and with contrast within normal limits except for minimal disc degeneration.    No results found for: \"25HYDROXY\", \"XOSHVYZT10\", \"TSHULTRASEN\", \"SPIFINTERP\", \"PYR989\", \"TOMAS\", \"HBA1C\", \"LDL\"             OBJECTIVE      Vitals:    07/18/25 0957   Weight: 80.7 kg (178 lb)   Height: 1.753 m (5' 9\")     Physical Exam  Speech clear and fluent    PREVIOUS EXAM FOR REFERENCE  Modified Efrem Scale:  Bilateral arms = 1  Bilateral knee = 2    Knee flexion, ankle plantarflexion, inversion    Limited ROM at ankles (toe tapping) but good knee flexion/extension (heel stomping)        PROCEDURE   N/A  "

## 2025-08-28 ENCOUNTER — HOSPITAL ENCOUNTER (OUTPATIENT)
Facility: MEDICAL CENTER | Age: 40
End: 2025-08-28
Attending: UROLOGY
Payer: MEDICARE

## 2025-08-28 LAB
APPEARANCE UR: ABNORMAL
BACTERIA #/AREA URNS HPF: NORMAL /HPF
BILIRUB UR QL STRIP.AUTO: NEGATIVE
CASTS URNS QL MICRO: NORMAL /LPF (ref 0–2)
COLOR UR: YELLOW
EPITHELIAL CELLS 1715: NORMAL /HPF (ref 0–5)
GLUCOSE UR STRIP.AUTO-MCNC: NEGATIVE MG/DL
KETONES UR STRIP.AUTO-MCNC: NEGATIVE MG/DL
LEUKOCYTE ESTERASE UR QL STRIP.AUTO: NEGATIVE
MICRO URNS: ABNORMAL
NITRITE UR QL STRIP.AUTO: NEGATIVE
PH UR STRIP.AUTO: 7 [PH] (ref 5–8)
PROT UR QL STRIP: NEGATIVE MG/DL
RBC # URNS HPF: NORMAL /HPF (ref 0–2)
RBC UR QL AUTO: NEGATIVE
SP GR UR STRIP.AUTO: 1.02
UROBILINOGEN UR STRIP.AUTO-MCNC: 0.2 EU/DL
WBC #/AREA URNS HPF: NORMAL /HPF

## 2025-08-28 PROCEDURE — 81001 URINALYSIS AUTO W/SCOPE: CPT

## 2025-08-28 PROCEDURE — 87086 URINE CULTURE/COLONY COUNT: CPT

## 2025-08-30 LAB
BACTERIA UR CULT: NORMAL
SIGNIFICANT IND 70042: NORMAL
SITE SITE: NORMAL
SOURCE SOURCE: NORMAL